# Patient Record
Sex: MALE | Race: WHITE | NOT HISPANIC OR LATINO | Employment: OTHER | URBAN - NONMETROPOLITAN AREA
[De-identification: names, ages, dates, MRNs, and addresses within clinical notes are randomized per-mention and may not be internally consistent; named-entity substitution may affect disease eponyms.]

---

## 2017-04-17 ENCOUNTER — OFFICE VISIT (OUTPATIENT)
Dept: FAMILY MEDICINE CLINIC | Facility: CLINIC | Age: 67
End: 2017-04-17

## 2017-04-17 VITALS
SYSTOLIC BLOOD PRESSURE: 136 MMHG | BODY MASS INDEX: 34.37 KG/M2 | WEIGHT: 219 LBS | HEIGHT: 67 IN | DIASTOLIC BLOOD PRESSURE: 86 MMHG

## 2017-04-17 DIAGNOSIS — F41.8 MIXED ANXIETY AND DEPRESSIVE DISORDER: Chronic | ICD-10-CM

## 2017-04-17 DIAGNOSIS — F41.0 PANIC ATTACKS: Primary | ICD-10-CM

## 2017-04-17 PROCEDURE — 99213 OFFICE O/P EST LOW 20 MIN: CPT | Performed by: INTERNAL MEDICINE

## 2017-04-17 RX ORDER — LORAZEPAM 0.5 MG/1
0.5 TABLET ORAL EVERY 8 HOURS PRN
Qty: 30 TABLET | Refills: 0 | Status: SHIPPED | OUTPATIENT
Start: 2017-04-17 | End: 2019-01-07 | Stop reason: SDUPTHER

## 2017-04-17 RX ORDER — CITALOPRAM 40 MG/1
40 TABLET ORAL DAILY
Qty: 90 TABLET | Refills: 3 | Status: SHIPPED | OUTPATIENT
Start: 2017-04-17 | End: 2017-06-13

## 2017-04-17 NOTE — PROGRESS NOTES
[facility]#  [id number]

## 2017-04-17 NOTE — PATIENT INSTRUCTIONS

## 2017-04-17 NOTE — PROGRESS NOTES
Subjective   Jaden Faith is a 67 y.o. male who presents to the office complaining of panic attacks.  He has a history of anxiety and depression, and currently takes Celexa 20 mg daily.  He has been having panic attacks approximately once per month for the past several months.  He feels like things are closing in on him and his heart starts racing.  He feels as if he is going to pass out and becomes fatigued.  He sits down to rest, and the symptoms eventually resolve.     History of Present Illness     The following portions of the patient's history were reviewed and updated as appropriate: allergies, current medications, past family history, past medical history, past social history, past surgical history and problem list.    Review of Systems   Constitutional: Negative for chills, fatigue and fever.   HENT: Negative for congestion, sneezing, sore throat and trouble swallowing.    Eyes: Negative for visual disturbance.   Respiratory: Negative for cough, chest tightness, shortness of breath and wheezing.    Cardiovascular: Negative for chest pain, palpitations and leg swelling.   Gastrointestinal: Negative for abdominal pain, constipation, diarrhea, nausea and vomiting.   Genitourinary: Negative for dysuria, frequency and urgency.   Musculoskeletal: Negative for neck pain.   Skin: Negative for rash.   Neurological: Negative for dizziness, weakness and headaches.   Psychiatric/Behavioral: The patient is nervous/anxious.         Patient denies any feelings of depression and has not felt down, hopeless or lost interest in any activities.   All other systems reviewed and are negative.      Objective   Physical Exam   Constitutional: He is oriented to person, place, and time. He appears well-developed and well-nourished. No distress.   HENT:   Head: Normocephalic and atraumatic.   Nose: Nose normal.   Mouth/Throat: Oropharynx is clear and moist. No oropharyngeal exudate.   Eyes: Conjunctivae and EOM are normal.  Pupils are equal, round, and reactive to light. No scleral icterus.   Neck: Normal range of motion. Neck supple.   Cardiovascular: Normal rate, regular rhythm and normal heart sounds.  Exam reveals no gallop and no friction rub.    No murmur heard.  Pulmonary/Chest: Effort normal and breath sounds normal. No respiratory distress. He has no wheezes. He has no rales.   Abdominal: Soft. Bowel sounds are normal. He exhibits no distension. There is no tenderness. There is no rebound and no guarding.   Musculoskeletal: Normal range of motion. He exhibits no edema.   Lymphadenopathy:     He has no cervical adenopathy.   Neurological: He is alert and oriented to person, place, and time. No cranial nerve deficit.   Skin: Skin is warm and dry. No rash noted.   Psychiatric: He has a normal mood and affect. His behavior is normal. Judgment and thought content normal.   Nursing note and vitals reviewed.      Assessment/Plan   Jaden was seen today for panic attacks.    Diagnoses and all orders for this visit:    Panic attacks  Comments:  New Problem  Orders:  -     LORazepam (ATIVAN) 0.5 MG tablet; Take 1 tablet by mouth Every 8 (Eight) Hours As Needed for Anxiety or Seizures (panic attacks).    Mixed anxiety and depressive disorder  -     citalopram (CeleXA) 40 MG tablet; Take 1 tablet by mouth Daily.  -     LORazepam (ATIVAN) 0.5 MG tablet; Take 1 tablet by mouth Every 8 (Eight) Hours As Needed for Anxiety or Seizures (panic attacks).     he will continue with Celexa and increase the dose to 40 mg daily.  He is also given lorazepam to use as needed during a panic attack.  He has a history of a seizure disorder, so he may also use the lorazepam in the event of a seizure, although he has been seizure-free for quite some time.    Patient understands the risks associated with this controlled medication, including tolerance and addiction.  Patient also agrees to only obtain this medication from me, and not from a another provider,  unless that provider is covering for me in my absence.  Patient also agrees to be compliant in dosing, and not self adjust the dose of medication.  A signed controlled substance agreement is on file, and the patient has received a controlled substance education sheet at this or a previous visit.  The patient has also signed a consent for treatment with a controlled substance as per [facility] policy. ALONDRA is obtained.    Patients BMI indicates that he is overweight.  I discussed the importance of weight loss, and have recommended a diet and exercise regimen.           PHQ-9 Depression Screening 4/17/2017   Little interest or pleasure in doing things 1   Feeling down, depressed, or hopeless 0   Trouble falling or staying asleep, or sleeping too much 0   Feeling tired or having little energy 0   Poor appetite or overeating 0   Feeling bad about yourself - or that you are a failure or have let yourself or your family down 0   Trouble concentrating on things, such as reading the newspaper or watching television 1   Moving or speaking so slowly that other people could have noticed. Or the opposite - being so fidgety or restless that you have been moving around a lot more than usual 1   Thoughts that you would be better off dead, or of hurting yourself in some way 0   PHQ-9 Total Score 3   If you checked off any problems, how difficult have these problems made it for you to do your work, take care of things at home, or get along with other people? Somewhat difficult

## 2017-05-16 ENCOUNTER — LAB (OUTPATIENT)
Dept: LAB | Facility: OTHER | Age: 67
End: 2017-05-16

## 2017-05-16 DIAGNOSIS — E78.2 MIXED HYPERLIPIDEMIA: Chronic | ICD-10-CM

## 2017-05-16 DIAGNOSIS — I10 ESSENTIAL HYPERTENSION: ICD-10-CM

## 2017-05-16 DIAGNOSIS — R73.02 IMPAIRED GLUCOSE TOLERANCE: ICD-10-CM

## 2017-05-16 LAB
ALBUMIN SERPL-MCNC: 4 G/DL (ref 3.2–5.5)
ALBUMIN/GLOB SERPL: 1.2 G/DL (ref 1–3)
ALP SERPL-CCNC: 65 U/L (ref 15–121)
ALT SERPL W P-5'-P-CCNC: 18 U/L (ref 10–60)
ANION GAP SERPL CALCULATED.3IONS-SCNC: 8 MMOL/L (ref 5–15)
AST SERPL-CCNC: 19 U/L (ref 10–60)
BACTERIA UR QL AUTO: ABNORMAL /HPF
BASOPHILS # BLD AUTO: 0.07 10*3/MM3 (ref 0–0.2)
BASOPHILS NFR BLD AUTO: 0.9 % (ref 0–2)
BILIRUB SERPL-MCNC: 1.2 MG/DL (ref 0.2–1)
BILIRUB UR QL STRIP: NEGATIVE
BUN BLD-MCNC: 23 MG/DL (ref 8–25)
BUN/CREAT SERPL: 25.6 (ref 7–25)
CALCIUM SPEC-SCNC: 9.2 MG/DL (ref 8.4–10.8)
CHLORIDE SERPL-SCNC: 106 MMOL/L (ref 100–112)
CHOLEST SERPL-MCNC: 172 MG/DL (ref 150–200)
CLARITY UR: ABNORMAL
CO2 SERPL-SCNC: 26 MMOL/L (ref 20–32)
COLOR UR: YELLOW
CREAT BLD-MCNC: 0.9 MG/DL (ref 0.4–1.3)
DEPRECATED RDW RBC AUTO: 45.2 FL (ref 35.1–43.9)
EOSINOPHIL # BLD AUTO: 0.19 10*3/MM3 (ref 0–0.7)
EOSINOPHIL NFR BLD AUTO: 2.4 % (ref 0–7)
ERYTHROCYTE [DISTWIDTH] IN BLOOD BY AUTOMATED COUNT: 14.3 % (ref 11.5–14.5)
GFR SERPL CREATININE-BSD FRML MDRD: 84 ML/MIN/1.73 (ref 49–113)
GLOBULIN UR ELPH-MCNC: 3.4 GM/DL (ref 2.5–4.6)
GLUCOSE BLD-MCNC: 120 MG/DL (ref 70–100)
GLUCOSE UR STRIP-MCNC: NEGATIVE MG/DL
HCT VFR BLD AUTO: 50.6 % (ref 39–49)
HDLC SERPL-MCNC: 34 MG/DL (ref 35–100)
HGB BLD-MCNC: 16.5 G/DL (ref 13.7–17.3)
HGB UR QL STRIP.AUTO: ABNORMAL
HYALINE CASTS UR QL AUTO: ABNORMAL /LPF
KETONES UR QL STRIP: NEGATIVE
LDLC SERPL CALC-MCNC: 112 MG/DL
LDLC/HDLC SERPL: 3.3 {RATIO}
LEUKOCYTE ESTERASE UR QL STRIP.AUTO: NEGATIVE
LYMPHOCYTES # BLD AUTO: 2.01 10*3/MM3 (ref 0.6–4.2)
LYMPHOCYTES NFR BLD AUTO: 25 % (ref 10–50)
MCH RBC QN AUTO: 28.9 PG (ref 26.5–34)
MCHC RBC AUTO-ENTMCNC: 32.6 G/DL (ref 31.5–36.3)
MCV RBC AUTO: 88.8 FL (ref 80–98)
MONOCYTES # BLD AUTO: 0.59 10*3/MM3 (ref 0–0.9)
MONOCYTES NFR BLD AUTO: 7.3 % (ref 0–12)
NEUTROPHILS # BLD AUTO: 5.19 10*3/MM3 (ref 2–8.6)
NEUTROPHILS NFR BLD AUTO: 64.4 % (ref 37–80)
NITRITE UR QL STRIP: NEGATIVE
PH UR STRIP.AUTO: 6.5 [PH] (ref 5.5–8)
PLATELET # BLD AUTO: 198 10*3/MM3 (ref 150–450)
PMV BLD AUTO: 11.9 FL (ref 8–12)
POTASSIUM BLD-SCNC: 4.3 MMOL/L (ref 3.4–5.4)
PROT SERPL-MCNC: 7.4 G/DL (ref 6.7–8.2)
PROT UR QL STRIP: ABNORMAL
RBC # BLD AUTO: 5.7 10*6/MM3 (ref 4.37–5.74)
RBC # UR: ABNORMAL /HPF
REF LAB TEST METHOD: ABNORMAL
SODIUM BLD-SCNC: 140 MMOL/L (ref 134–146)
SP GR UR STRIP: 1.02 (ref 1–1.03)
SQUAMOUS #/AREA URNS HPF: ABNORMAL /HPF
TRIGL SERPL-MCNC: 129 MG/DL (ref 35–160)
UROBILINOGEN UR QL STRIP: ABNORMAL
VLDLC SERPL-MCNC: 25.8 MG/DL
WBC NRBC COR # BLD: 8.05 10*3/MM3 (ref 3.2–9.8)
WBC UR QL AUTO: ABNORMAL /HPF

## 2017-05-16 PROCEDURE — 84439 ASSAY OF FREE THYROXINE: CPT | Performed by: INTERNAL MEDICINE

## 2017-05-16 PROCEDURE — 81001 URINALYSIS AUTO W/SCOPE: CPT | Performed by: INTERNAL MEDICINE

## 2017-05-16 PROCEDURE — 80053 COMPREHEN METABOLIC PANEL: CPT | Performed by: INTERNAL MEDICINE

## 2017-05-16 PROCEDURE — 36415 COLL VENOUS BLD VENIPUNCTURE: CPT | Performed by: INTERNAL MEDICINE

## 2017-05-16 PROCEDURE — 83036 HEMOGLOBIN GLYCOSYLATED A1C: CPT | Performed by: INTERNAL MEDICINE

## 2017-05-16 PROCEDURE — 80061 LIPID PANEL: CPT | Performed by: INTERNAL MEDICINE

## 2017-05-16 PROCEDURE — 84443 ASSAY THYROID STIM HORMONE: CPT | Performed by: INTERNAL MEDICINE

## 2017-05-16 PROCEDURE — 85025 COMPLETE CBC W/AUTO DIFF WBC: CPT | Performed by: INTERNAL MEDICINE

## 2017-05-17 LAB
HBA1C MFR BLD: 6.34 % (ref 4–5.6)
T4 FREE SERPL-MCNC: 1.13 NG/DL (ref 0.78–2.19)
TSH SERPL DL<=0.05 MIU/L-ACNC: 1.46 MIU/ML (ref 0.46–4.68)

## 2017-05-18 ENCOUNTER — OFFICE VISIT (OUTPATIENT)
Dept: FAMILY MEDICINE CLINIC | Facility: CLINIC | Age: 67
End: 2017-05-18

## 2017-05-18 VITALS
HEIGHT: 67 IN | SYSTOLIC BLOOD PRESSURE: 126 MMHG | BODY MASS INDEX: 34.53 KG/M2 | HEART RATE: 68 BPM | DIASTOLIC BLOOD PRESSURE: 80 MMHG | WEIGHT: 220 LBS

## 2017-05-18 DIAGNOSIS — R73.02 IMPAIRED GLUCOSE TOLERANCE: Primary | Chronic | ICD-10-CM

## 2017-05-18 DIAGNOSIS — I10 ESSENTIAL HYPERTENSION: Chronic | ICD-10-CM

## 2017-05-18 DIAGNOSIS — F41.8 MIXED ANXIETY AND DEPRESSIVE DISORDER: Chronic | ICD-10-CM

## 2017-05-18 DIAGNOSIS — Z11.59 NEED FOR HEPATITIS C SCREENING TEST: ICD-10-CM

## 2017-05-18 DIAGNOSIS — E78.2 MIXED HYPERLIPIDEMIA: Chronic | ICD-10-CM

## 2017-05-18 PROBLEM — C61 PROSTATE CANCER: Chronic | Status: ACTIVE | Noted: 2017-05-18

## 2017-05-18 PROCEDURE — 99214 OFFICE O/P EST MOD 30 MIN: CPT | Performed by: INTERNAL MEDICINE

## 2017-05-18 RX ORDER — METOPROLOL SUCCINATE 50 MG/1
50 TABLET, EXTENDED RELEASE ORAL DAILY
Qty: 90 TABLET | Refills: 3 | Status: SHIPPED | OUTPATIENT
Start: 2017-05-18 | End: 2018-04-16 | Stop reason: SDUPTHER

## 2017-06-13 ENCOUNTER — OFFICE VISIT (OUTPATIENT)
Dept: FAMILY MEDICINE CLINIC | Facility: CLINIC | Age: 67
End: 2017-06-13

## 2017-06-13 VITALS
HEART RATE: 70 BPM | BODY MASS INDEX: 34.53 KG/M2 | TEMPERATURE: 98 F | SYSTOLIC BLOOD PRESSURE: 130 MMHG | DIASTOLIC BLOOD PRESSURE: 70 MMHG | WEIGHT: 220 LBS | HEIGHT: 67 IN
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
## 2017-06-13 DIAGNOSIS — F41.8 MIXED ANXIETY AND DEPRESSIVE DISORDER: Chronic | ICD-10-CM

## 2017-06-13 DIAGNOSIS — R56.9 SEIZURES (HCC): Chronic | ICD-10-CM

## 2017-06-13 PROCEDURE — 99214 OFFICE O/P EST MOD 30 MIN: CPT | Performed by: INTERNAL MEDICINE

## 2017-06-13 RX ORDER — FLUOXETINE HYDROCHLORIDE 40 MG/1
40 CAPSULE ORAL DAILY
Qty: 90 CAPSULE | Refills: 1 | Status: SHIPPED | OUTPATIENT
Start: 2017-06-13 | End: 2017-11-09 | Stop reason: SDUPTHER
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
## 2017-08-14 DIAGNOSIS — R56.9 SEIZURES (HCC): Chronic | ICD-10-CM

## 2017-08-14 RX ORDER — LEVETIRACETAM 500 MG/1
500 TABLET ORAL 2 TIMES DAILY
Qty: 180 TABLET | Refills: 3 | Status: SHIPPED | OUTPATIENT
Start: 2017-08-14 | End: 2018-07-06 | Stop reason: SDUPTHER

## 2017-09-05 DIAGNOSIS — K21.9 GASTROESOPHAGEAL REFLUX DISEASE WITHOUT ESOPHAGITIS: Chronic | ICD-10-CM

## 2017-09-11 RX ORDER — OMEPRAZOLE 20 MG/1
CAPSULE, DELAYED RELEASE ORAL
Qty: 90 CAPSULE | Refills: 3 | Status: SHIPPED | OUTPATIENT
Start: 2017-09-11 | End: 2018-05-15 | Stop reason: SDUPTHER

## 2017-11-09 DIAGNOSIS — F41.8 MIXED ANXIETY AND DEPRESSIVE DISORDER: Chronic | ICD-10-CM

## 2017-11-09 RX ORDER — FLUOXETINE HYDROCHLORIDE 40 MG/1
CAPSULE ORAL
Qty: 90 CAPSULE | Refills: 1 | Status: SHIPPED | OUTPATIENT
Start: 2017-11-09 | End: 2018-06-12 | Stop reason: SDUPTHER

## 2017-12-15 DIAGNOSIS — Z12.5 ENCOUNTER FOR SCREENING FOR MALIGNANT NEOPLASM OF PROSTATE: ICD-10-CM

## 2017-12-15 DIAGNOSIS — I10 ESSENTIAL HYPERTENSION: Primary | Chronic | ICD-10-CM

## 2017-12-15 DIAGNOSIS — E78.2 MIXED HYPERLIPIDEMIA: Chronic | ICD-10-CM

## 2017-12-15 DIAGNOSIS — R73.02 IMPAIRED GLUCOSE TOLERANCE: Chronic | ICD-10-CM

## 2017-12-28 ENCOUNTER — LAB (OUTPATIENT)
Dept: LAB | Facility: OTHER | Age: 67
End: 2017-12-28

## 2017-12-28 DIAGNOSIS — E78.2 MIXED HYPERLIPIDEMIA: Chronic | ICD-10-CM

## 2017-12-28 DIAGNOSIS — I10 ESSENTIAL HYPERTENSION: ICD-10-CM

## 2017-12-28 DIAGNOSIS — R73.02 IMPAIRED GLUCOSE TOLERANCE: Chronic | ICD-10-CM

## 2017-12-28 LAB
ALBUMIN SERPL-MCNC: 3.9 G/DL (ref 3.2–5.5)
ALBUMIN/GLOB SERPL: 1.1 G/DL (ref 1–3)
ALP SERPL-CCNC: 69 U/L (ref 15–121)
ALT SERPL W P-5'-P-CCNC: 21 U/L (ref 10–60)
ANION GAP SERPL CALCULATED.3IONS-SCNC: 9 MMOL/L (ref 5–15)
ARTICHOKE IGE QN: 139 MG/DL (ref 0–129)
AST SERPL-CCNC: 22 U/L (ref 10–60)
BACTERIA UR QL AUTO: ABNORMAL /HPF
BASOPHILS # BLD AUTO: 0.07 10*3/MM3 (ref 0–0.2)
BASOPHILS NFR BLD AUTO: 0.9 % (ref 0–2)
BILIRUB SERPL-MCNC: 1.2 MG/DL (ref 0.2–1)
BILIRUB UR QL STRIP: NEGATIVE
BUN BLD-MCNC: 26 MG/DL (ref 8–25)
BUN/CREAT SERPL: 28.9 (ref 7–25)
CALCIUM SPEC-SCNC: 9.4 MG/DL (ref 8.4–10.8)
CHLORIDE SERPL-SCNC: 106 MMOL/L (ref 100–112)
CLARITY UR: CLEAR
CO2 SERPL-SCNC: 27 MMOL/L (ref 20–32)
COLOR UR: YELLOW
CREAT BLD-MCNC: 0.9 MG/DL (ref 0.4–1.3)
DEPRECATED RDW RBC AUTO: 44.6 FL (ref 35.1–43.9)
EOSINOPHIL # BLD AUTO: 0.21 10*3/MM3 (ref 0–0.7)
EOSINOPHIL NFR BLD AUTO: 2.6 % (ref 0–7)
ERYTHROCYTE [DISTWIDTH] IN BLOOD BY AUTOMATED COUNT: 14.6 % (ref 11.5–14.5)
GFR SERPL CREATININE-BSD FRML MDRD: 84 ML/MIN/1.73 (ref 49–113)
GLOBULIN UR ELPH-MCNC: 3.4 GM/DL (ref 2.5–4.6)
GLUCOSE BLD-MCNC: 131 MG/DL (ref 70–100)
GLUCOSE UR STRIP-MCNC: NEGATIVE MG/DL
HBA1C MFR BLD: 6.3 % (ref 4–5.6)
HCT VFR BLD AUTO: 54.8 % (ref 39–49)
HGB BLD-MCNC: 17.8 G/DL (ref 13.7–17.3)
HGB UR QL STRIP.AUTO: ABNORMAL
HYALINE CASTS UR QL AUTO: ABNORMAL /LPF
KETONES UR QL STRIP: NEGATIVE
LEUKOCYTE ESTERASE UR QL STRIP.AUTO: NEGATIVE
LYMPHOCYTES # BLD AUTO: 1.58 10*3/MM3 (ref 0.6–4.2)
LYMPHOCYTES NFR BLD AUTO: 19.3 % (ref 10–50)
MCH RBC QN AUTO: 28 PG (ref 26.5–34)
MCHC RBC AUTO-ENTMCNC: 32.5 G/DL (ref 31.5–36.3)
MCV RBC AUTO: 86.3 FL (ref 80–98)
MONOCYTES # BLD AUTO: 0.55 10*3/MM3 (ref 0–0.9)
MONOCYTES NFR BLD AUTO: 6.7 % (ref 0–12)
MUCOUS THREADS URNS QL MICRO: ABNORMAL /HPF
NEUTROPHILS # BLD AUTO: 5.79 10*3/MM3 (ref 2–8.6)
NEUTROPHILS NFR BLD AUTO: 70.5 % (ref 37–80)
NITRITE UR QL STRIP: NEGATIVE
PH UR STRIP.AUTO: 7 [PH] (ref 5.5–8)
PLATELET # BLD AUTO: 224 10*3/MM3 (ref 150–450)
PMV BLD AUTO: 11.2 FL (ref 8–12)
POTASSIUM BLD-SCNC: 4.2 MMOL/L (ref 3.4–5.4)
PROT SERPL-MCNC: 7.3 G/DL (ref 6.7–8.2)
PROT UR QL STRIP: ABNORMAL
RBC # BLD AUTO: 6.35 10*6/MM3 (ref 4.37–5.74)
RBC # UR: ABNORMAL /HPF
REF LAB TEST METHOD: ABNORMAL
SODIUM BLD-SCNC: 142 MMOL/L (ref 134–146)
SP GR UR STRIP: 1.02 (ref 1–1.03)
SQUAMOUS #/AREA URNS HPF: ABNORMAL /HPF
TSH SERPL DL<=0.05 MIU/L-ACNC: 1.73 MIU/ML (ref 0.46–4.68)
UROBILINOGEN UR QL STRIP: ABNORMAL
WBC NRBC COR # BLD: 8.2 10*3/MM3 (ref 3.2–9.8)
WBC UR QL AUTO: ABNORMAL /HPF

## 2017-12-28 PROCEDURE — 84443 ASSAY THYROID STIM HORMONE: CPT | Performed by: INTERNAL MEDICINE

## 2017-12-28 PROCEDURE — 83721 ASSAY OF BLOOD LIPOPROTEIN: CPT | Performed by: INTERNAL MEDICINE

## 2017-12-28 PROCEDURE — 36415 COLL VENOUS BLD VENIPUNCTURE: CPT | Performed by: INTERNAL MEDICINE

## 2017-12-28 PROCEDURE — 85025 COMPLETE CBC W/AUTO DIFF WBC: CPT | Performed by: INTERNAL MEDICINE

## 2017-12-28 PROCEDURE — 83036 HEMOGLOBIN GLYCOSYLATED A1C: CPT | Performed by: INTERNAL MEDICINE

## 2017-12-28 PROCEDURE — 80053 COMPREHEN METABOLIC PANEL: CPT | Performed by: INTERNAL MEDICINE

## 2017-12-28 PROCEDURE — 81001 URINALYSIS AUTO W/SCOPE: CPT | Performed by: INTERNAL MEDICINE

## 2018-01-05 ENCOUNTER — OFFICE VISIT (OUTPATIENT)
Dept: FAMILY MEDICINE CLINIC | Facility: CLINIC | Age: 68
End: 2018-01-05

## 2018-01-05 VITALS
WEIGHT: 222 LBS | DIASTOLIC BLOOD PRESSURE: 80 MMHG | BODY MASS INDEX: 34.84 KG/M2 | SYSTOLIC BLOOD PRESSURE: 124 MMHG | HEIGHT: 67 IN | HEART RATE: 66 BPM

## 2018-01-05 DIAGNOSIS — Z23 PNEUMOCOCCAL VACCINATION GIVEN: ICD-10-CM

## 2018-01-05 DIAGNOSIS — R73.02 IMPAIRED GLUCOSE TOLERANCE: Chronic | ICD-10-CM

## 2018-01-05 DIAGNOSIS — F41.8 MIXED ANXIETY AND DEPRESSIVE DISORDER: Chronic | ICD-10-CM

## 2018-01-05 DIAGNOSIS — E78.2 MIXED HYPERLIPIDEMIA: Chronic | ICD-10-CM

## 2018-01-05 DIAGNOSIS — Z11.59 NEED FOR HEPATITIS C SCREENING TEST: ICD-10-CM

## 2018-01-05 DIAGNOSIS — Z00.00 INITIAL MEDICARE ANNUAL WELLNESS VISIT: Primary | ICD-10-CM

## 2018-01-05 DIAGNOSIS — I10 ESSENTIAL HYPERTENSION: Chronic | ICD-10-CM

## 2018-01-05 PROCEDURE — 99214 OFFICE O/P EST MOD 30 MIN: CPT | Performed by: INTERNAL MEDICINE

## 2018-01-05 PROCEDURE — 90732 PPSV23 VACC 2 YRS+ SUBQ/IM: CPT | Performed by: INTERNAL MEDICINE

## 2018-01-05 PROCEDURE — G0009 ADMIN PNEUMOCOCCAL VACCINE: HCPCS | Performed by: INTERNAL MEDICINE

## 2018-01-05 PROCEDURE — G0438 PPPS, INITIAL VISIT: HCPCS | Performed by: INTERNAL MEDICINE

## 2018-01-05 NOTE — PROGRESS NOTES
QUICK REFERENCE INFORMATION:  The ABCs of the Annual Wellness Visit    Initial Medicare Wellness Visit    HEALTH RISK ASSESSMENT    1950    Recent Hospitalizations:  Recently treated at the following:  Other: [facility], 02/2017 (Prostatectomy).        Current Medical Providers:  Patient Care Team:  Ruiz Li MD as PCP - General  Ruiz Li MD as PCP - Claims Attributed  Garth Hawley MD as Consulting Physician (Neurology)  Gordon Lorenzo MD as Consulting Physician (Urology)  Ed Colunga MD as Surgeon (Otolaryngology)        Smoking Status:  History   Smoking Status   • Never Smoker   Smokeless Tobacco   • Never Used       Alcohol Consumption:  History   Alcohol Use No       Depression Screen:   PHQ-2/PHQ-9 Depression Screening 1/5/2018   Little interest or pleasure in doing things 0   Feeling down, depressed, or hopeless 0   Trouble falling or staying asleep, or sleeping too much 1   Feeling tired or having little energy 0   Poor appetite or overeating 0   Feeling bad about yourself - or that you are a failure or have let yourself or your family down 0   Trouble concentrating on things, such as reading the newspaper or watching television 0   Moving or speaking so slowly that other people could have noticed. Or the opposite - being so fidgety or restless that you have been moving around a lot more than usual 0   Thoughts that you would be better off dead, or of hurting yourself in some way 0   Total Score 1   If you checked off any problems, how difficult have these problems made it for you to do your work, take care of things at home, or get along with other people? Not difficult at all       Health Habits and Functional and Cognitive Screening:  Functional & Cognitive Status 1/5/2018   Do you have difficulty preparing food and eating? No   Do you have difficulty bathing yourself, getting dressed or grooming yourself? No   Do you have difficulty using the toilet? No   Do you have  difficulty moving around from place to place? No   Do you have trouble with steps or getting out of a bed or a chair? No   In the past year have you fallen or experienced a near fall? No   Current Diet Well Balanced Diet   Dental Exam Not up to date   Eye Exam Not up to date   Exercise (times per week) 4 times per week   Current Exercise Activities Include Walking   Do you need help using the phone?  No   Are you deaf or do you have serious difficulty hearing?  No   Do you need help with transportation? No   Do you need help shopping? No   Do you need help preparing meals?  No   Do you need help with housework?  No   Do you need help with laundry? No   Do you need help taking your medications? No   Do you need help managing money? No   Have you felt unusual stress, anger or loneliness in the last month? No   Who do you live with? Spouse   If you need help, do you have trouble finding someone available to you? No   Have you been bothered in the last four weeks by sexual problems? No   Do you have difficulty concentrating, remembering or making decisions? No           Does the patient have evidence of cognitive impairment? No    Asiprin use counseling: not taking ASA      Recent Lab Results:    Visual Acuity:  No exam data present    Age-appropriate Screening Schedule:  Refer to the list below for future screening recommendations based on patient's age, sex and/or medical conditions. Orders for these recommended tests are listed in the plan section. The patient has been provided with a written plan.    Health Maintenance   Topic Date Due   • ZOSTER VACCINE  09/30/2016   • PROSTATE CANCER SCREENING  11/14/2017   • PNEUMOCOCCAL VACCINES (65+ LOW/MEDIUM RISK) (2 of 2 - PPSV23) 11/17/2017   • LIPID PANEL  12/28/2018   • COLONOSCOPY  06/06/2026   • TDAP/TD VACCINES (2 - Td) 11/17/2026   • INFLUENZA VACCINE  Completed        Subjective   History of Present Illness    Jaden Faith is a 67 y.o. male who presents for  "an Annual Wellness Visit.    The following portions of the patient's history were reviewed and updated as appropriate: allergies, current medications, past family history, past medical history, past social history, past surgical history and problem list.    Outpatient Medications Prior to Visit   Medication Sig Dispense Refill   • FLUoxetine (PROzac) 40 MG capsule TAKE 1 CAPSULE EVERY DAY (REPLACES CITALOPRAM). 90 capsule 1   • levETIRAcetam (KEPPRA) 500 MG tablet Take 1 tablet by mouth 2 (Two) Times a Day. 180 tablet 3   • metoprolol succinate XL (TOPROL-XL) 50 MG 24 hr tablet Take 1 tablet by mouth Daily. 90 tablet 3   • Multiple Vitamins-Minerals (MULTIVITAMIN ADULT PO) Take 1 tablet by mouth Daily.     • omeprazole (priLOSEC) 20 MG capsule TAKE 1 CAPSULE BY MOUTH DAILY. 90 capsule 3   • LORazepam (ATIVAN) 0.5 MG tablet Take 1 tablet by mouth Every 8 (Eight) Hours As Needed for Anxiety or Seizures (panic attacks). 30 tablet 0     No facility-administered medications prior to visit.        Patient Active Problem List   Diagnosis   • Mixed hyperlipidemia   • Gastroesophageal reflux disease without esophagitis   • Non-seasonal allergic rhinitis due to pollen   • Essential hypertension   • Peripheral vascular disease   • Impaired glucose tolerance   • Mixed anxiety and depressive disorder   • Seizures   • Prostate cancer       Advance Care Planning:  has NO advance directive - not interested in additional information    Identification of Risk Factors:  Risk factors include: weight .    Review of Systems    Compared to one year ago, the patient feels his physical health is the same.  Compared to one year ago, the patient feels his mental health is the same.    Objective     Physical Exam    Vitals:    01/05/18 0859   BP: 124/80   BP Location: Left arm   Patient Position: Sitting   Cuff Size: Large Adult   Pulse: 66   Weight: 101 kg (222 lb)   Height: 170.2 cm (67\")   PainSc: 0-No pain       Body mass index is 34.77 " kg/(m^2).  Discussed the patient's BMI with him. BMI is above normal parameters. Follow-up plan includes:  educational material, exercise counseling and nutrition counseling.    Assessment/Plan   Patient Self-Management and Personalized Health Advice  The patient has been provided with information about: diet, exercise and weight management and preventive services including:   · Diabetes screening, see lab orders, Exercise counseling provided, Fall Risk assessment done, Nutrition counseling provided, Pneumococcal vaccine , Prostate cancer screening discussed.    Visit Diagnoses:    ICD-10-CM ICD-9-CM   1. Initial Medicare annual wellness visit Z00.00 V70.0   2. Essential hypertension I10 401.9   3. Mixed hyperlipidemia E78.2 272.2   4. Impaired glucose tolerance R73.02 790.22   5. Mixed anxiety and depressive disorder F41.8 300.4   6. Need for hepatitis C screening test Z11.59 V73.89   7. Pneumococcal vaccination given Z23 V06.6       Orders Placed This Encounter   Procedures   • Pneumococcal Polysaccharide Vaccine 23-Valent Greater Than or Equal To 1yo Subcutaneous / IM   • Comprehensive Metabolic Panel     Standing Status:   Future     Standing Expiration Date:   7/24/2018   • Hemoglobin A1c     Standing Status:   Future     Standing Expiration Date:   7/24/2018   • Lipid Panel     Standing Status:   Future     Standing Expiration Date:   7/24/2018   • T4, Free     Standing Status:   Future     Standing Expiration Date:   7/24/2018   • TSH     Standing Status:   Future     Standing Expiration Date:   7/24/2018   • Urinalysis With / Culture If Indicated - Urine, Clean Catch     Standing Status:   Future     Standing Expiration Date:   7/24/2018   • Hepatitis C Antibody     Standing Status:   Future     Standing Expiration Date:   7/24/2018   • CBC & Differential     Standing Status:   Future     Standing Expiration Date:   7/24/2018     Order Specific Question:   Manual Differential     Answer:   No        Outpatient Encounter Prescriptions as of 1/5/2018   Medication Sig Dispense Refill   • FLUoxetine (PROzac) 40 MG capsule TAKE 1 CAPSULE EVERY DAY (REPLACES CITALOPRAM). 90 capsule 1   • levETIRAcetam (KEPPRA) 500 MG tablet Take 1 tablet by mouth 2 (Two) Times a Day. 180 tablet 3   • metoprolol succinate XL (TOPROL-XL) 50 MG 24 hr tablet Take 1 tablet by mouth Daily. 90 tablet 3   • Multiple Vitamins-Minerals (MULTIVITAMIN ADULT PO) Take 1 tablet by mouth Daily.     • omeprazole (priLOSEC) 20 MG capsule TAKE 1 CAPSULE BY MOUTH DAILY. 90 capsule 3   • LORazepam (ATIVAN) 0.5 MG tablet Take 1 tablet by mouth Every 8 (Eight) Hours As Needed for Anxiety or Seizures (panic attacks). 30 tablet 0     No facility-administered encounter medications on file as of 1/5/2018.        Reviewed use of high risk medication in the elderly: no  Reviewed for potential of harmful drug interactions in the elderly: no    Follow Up:  Return in about 6 months (around 7/5/2018) for Next scheduled follow up, Or sooner as needed With Labs prior to appointment.     An After Visit Summary and PPPS with all of these plans were given to the patient.

## 2018-01-05 NOTE — PROGRESS NOTES
Chief Complaint   Patient presents with   • Annual Exam     medicare wellness   • Hypertension     Subjective   Jaden Faith is a 67 y.o. male who presents to the office for follow-up and review of labs. He has impaired glucose tolerance and has been monitoring his dietary intake of sugars and carbohydrates.  He has hypertension and his blood pressure has been well controlled.  He has hyperlipidemia and controls this with diet.  He has GERD which is well controlled with omeprazole.  He takes Celexa for treatment of anxiety and depression.  He has a history of seizures and takes Keppra.  He has been seizure-free.      He had a prostatectomy in February 2017. He continues to follow with urology, who manages his PSA levels.     The following portions of the patient's history were reviewed and updated as appropriate: allergies, current medications, past family history, past medical history, past social history, past surgical history and problem list.    Review of Systems   Constitutional: Negative for chills, fatigue and fever.   HENT: Negative for congestion, sneezing, sore throat and trouble swallowing.    Eyes: Negative for visual disturbance.   Respiratory: Negative for cough, chest tightness, shortness of breath and wheezing.    Cardiovascular: Negative for chest pain, palpitations and leg swelling.   Gastrointestinal: Negative for abdominal pain, constipation, diarrhea, nausea and vomiting.   Genitourinary: Negative for dysuria, frequency and urgency.   Musculoskeletal: Negative for neck pain.   Skin: Negative for rash.   Neurological: Negative for dizziness, weakness and headaches.   Psychiatric/Behavioral:        Patient denies any feelings of depression and has not felt down, hopeless or lost interest in any activities.   All other systems reviewed and are negative.      Objective   Vitals:    01/05/18 0859   BP: 124/80   BP Location: Left arm   Patient Position: Sitting   Cuff Size: Large Adult  "  Pulse: 66   Weight: 101 kg (222 lb)   Height: 170.2 cm (67\")   PainSc: 0-No pain     Physical Exam   Constitutional: He is oriented to person, place, and time. He appears well-developed and well-nourished. No distress.   HENT:   Head: Normocephalic and atraumatic.   Nose: Nose normal.   Mouth/Throat: Oropharynx is clear and moist. No oropharyngeal exudate.   Eyes: Conjunctivae and EOM are normal. Pupils are equal, round, and reactive to light. No scleral icterus.   Neck: Normal range of motion. Neck supple.   Cardiovascular: Normal rate, regular rhythm and normal heart sounds.  Exam reveals no gallop and no friction rub.    No murmur heard.  Pulmonary/Chest: Effort normal and breath sounds normal. No respiratory distress. He has no wheezes. He has no rales.   Abdominal: Soft. Bowel sounds are normal. He exhibits no distension. There is no tenderness. There is no rebound and no guarding.   Musculoskeletal: Normal range of motion. He exhibits no edema.   Lymphadenopathy:     He has no cervical adenopathy.   Neurological: He is alert and oriented to person, place, and time. No cranial nerve deficit.   Skin: Skin is warm and dry. No rash noted.   Psychiatric: He has a normal mood and affect. His behavior is normal. Judgment and thought content normal.   Nursing note and vitals reviewed.      Assessment/Plan   Jaden was seen today for annual exam and hypertension.    Diagnoses and all orders for this visit:    Initial Medicare annual wellness visit    Essential hypertension  -     CBC & Differential; Future  -     Comprehensive Metabolic Panel; Future  -     T4, Free; Future  -     TSH; Future  -     Urinalysis With / Culture If Indicated - Urine, Clean Catch; Future    Mixed hyperlipidemia  -     Lipid Panel; Future    Impaired glucose tolerance  -     Hemoglobin A1c; Future    Mixed anxiety and depressive disorder    Need for hepatitis C screening test  -     Hepatitis C Antibody; Future    Pneumococcal vaccination " given  -     Pneumococcal Polysaccharide Vaccine 23-Valent Greater Than or Equal To 1yo Subcutaneous / IM         Labs are reviewed with patient. Patient's glucose is slightly elevated.  Patient will continue to watch diet to help maintain control of the glucose.  Patient understands that there is an increased risk of developing diabetes in the future.  His lipids are controlled and he will continue with dietary management of the hyperlipidemia.  His blood pressure is controlled, so he will continue with his current blood pressure medication.  He will continue with citalopram for treatment of the anxiety and depression.  He will also continue with lorazepam as needed, although he does not need a prescription for this today.  He will continue to follow with urology secondary to the history of prostate cancer.    Patient understands the risks associated with this controlled medication, including tolerance and addiction.  Patient also agrees to only obtain this medication from me, and not from a another provider, unless that provider is covering for me in my absence.  Patient also agrees to be compliant in dosing, and not self adjust the dose of medication.  A signed controlled substance agreement is on file, and the patient has received a controlled substance education sheet at this a previous visit.  The patient has also signed a consent for treatment with a controlled substance as per [facility] policy. ALONDRA was obtained.    PHQ-2/PHQ-9 Depression Screening 1/5/2018   Little interest or pleasure in doing things 0   Feeling down, depressed, or hopeless 0   Trouble falling or staying asleep, or sleeping too much 1   Feeling tired or having little energy 0   Poor appetite or overeating 0   Feeling bad about yourself - or that you are a failure or have let yourself or your family down 0   Trouble concentrating on things, such as reading the newspaper or watching television 0   Moving or speaking so slowly that  other people could have noticed. Or the opposite - being so fidgety or restless that you have been moving around a lot more than usual 0   Thoughts that you would be better off dead, or of hurting yourself in some way 0   Total Score 1   If you checked off any problems, how difficult have these problems made it for you to do your work, take care of things at home, or get along with other people? Not difficult at all         Lab on 12/28/2017   Component Date Value Ref Range Status   • Color, UA 12/28/2017 Yellow  Yellow, Straw Final   • Appearance, UA 12/28/2017 Clear  Clear Final   • pH, UA 12/28/2017 7.0  5.5 - 8.0 Final   • Specific Gravity, UA 12/28/2017 1.020  1.005 - 1.030 Final   • Glucose, UA 12/28/2017 Negative  Negative Final   • Ketones, UA 12/28/2017 Negative  Negative Final   • Bilirubin, UA 12/28/2017 Negative  Negative Final   • Blood, UA 12/28/2017 Trace* Negative Final   • Protein, UA 12/28/2017 30 mg/dL (1+)* Negative Final   • Leuk Esterase, UA 12/28/2017 Negative  Negative Final   • Nitrite, UA 12/28/2017 Negative  Negative Final   • Urobilinogen, UA 12/28/2017 0.2 E.U./dL  0.2 - 1.0 E.U./dL Final   • Glucose 12/28/2017 131* 70 - 100 mg/dL Final   • BUN 12/28/2017 26* 8 - 25 mg/dL Final   • Creatinine 12/28/2017 0.90  0.40 - 1.30 mg/dL Final   • Sodium 12/28/2017 142  134 - 146 mmol/L Final   • Potassium 12/28/2017 4.2  3.4 - 5.4 mmol/L Final   • Chloride 12/28/2017 106  100 - 112 mmol/L Final   • CO2 12/28/2017 27.0  20.0 - 32.0 mmol/L Final   • Calcium 12/28/2017 9.4  8.4 - 10.8 mg/dL Final   • Total Protein 12/28/2017 7.3  6.7 - 8.2 g/dL Final   • Albumin 12/28/2017 3.90  3.20 - 5.50 g/dL Final   • ALT (SGPT) 12/28/2017 21  10 - 60 U/L Final   • AST (SGOT) 12/28/2017 22  10 - 60 U/L Final   • Alkaline Phosphatase 12/28/2017 69  15 - 121 U/L Final   • Total Bilirubin 12/28/2017 1.2* 0.2 - 1.0 mg/dL Final   • eGFR Non  Amer 12/28/2017 84  49 - 113 mL/min/1.73 Final   • Globulin 12/28/2017  3.4  2.5 - 4.6 gm/dL Final   • A/G Ratio 12/28/2017 1.1  1.0 - 3.0 g/dL Final   • BUN/Creatinine Ratio 12/28/2017 28.9* 7.0 - 25.0 Final   • Anion Gap 12/28/2017 9.0  5.0 - 15.0 mmol/L Final   • LDL Cholesterol  12/28/2017 139* 0 - 129 mg/dL Final   • TSH 12/28/2017 1.730  0.460 - 4.680 mIU/mL Final   • Hemoglobin A1C 12/28/2017 6.3* 4 - 5.6 % Final   • WBC 12/28/2017 8.20  3.20 - 9.80 10*3/mm3 Final   • RBC 12/28/2017 6.35* 4.37 - 5.74 10*6/mm3 Final   • Hemoglobin 12/28/2017 17.8* 13.7 - 17.3 g/dL Final   • Hematocrit 12/28/2017 54.8* 39.0 - 49.0 % Final   • MCV 12/28/2017 86.3  80.0 - 98.0 fL Final   • MCH 12/28/2017 28.0  26.5 - 34.0 pg Final   • MCHC 12/28/2017 32.5  31.5 - 36.3 g/dL Final   • RDW 12/28/2017 14.6* 11.5 - 14.5 % Final   • RDW-SD 12/28/2017 44.6* 35.1 - 43.9 fl Final   • MPV 12/28/2017 11.2  8.0 - 12.0 fL Final   • Platelets 12/28/2017 224  150 - 450 10*3/mm3 Final   • Neutrophil % 12/28/2017 70.5  37.0 - 80.0 % Final   • Lymphocyte % 12/28/2017 19.3  10.0 - 50.0 % Final   • Monocyte % 12/28/2017 6.7  0.0 - 12.0 % Final   • Eosinophil % 12/28/2017 2.6  0.0 - 7.0 % Final   • Basophil % 12/28/2017 0.9  0.0 - 2.0 % Final   • Neutrophils, Absolute 12/28/2017 5.79  2.00 - 8.60 10*3/mm3 Final   • Lymphocytes, Absolute 12/28/2017 1.58  0.60 - 4.20 10*3/mm3 Final   • Monocytes, Absolute 12/28/2017 0.55  0.00 - 0.90 10*3/mm3 Final   • Eosinophils, Absolute 12/28/2017 0.21  0.00 - 0.70 10*3/mm3 Final   • Basophils, Absolute 12/28/2017 0.07  0.00 - 0.20 10*3/mm3 Final   • RBC, UA 12/28/2017 3-5* None Seen /HPF Final   • WBC, UA 12/28/2017 0-2* None Seen /HPF Final   • Bacteria, UA 12/28/2017 Trace* None Seen /HPF Final   • Squamous Epithelial Cells, UA 12/28/2017 None Seen  None Seen, 0-2 /HPF Final   • Hyaline Casts, UA 12/28/2017 None Seen  None Seen /LPF Final   • Mucus, UA 12/28/2017 Small/1+* None Seen, Trace /HPF Final   • Methodology 12/28/2017 Manual Light Microscopy   Final   ]

## 2018-01-05 NOTE — PATIENT INSTRUCTIONS

## 2018-04-16 DIAGNOSIS — I10 ESSENTIAL HYPERTENSION: Chronic | ICD-10-CM

## 2018-04-16 RX ORDER — METOPROLOL SUCCINATE 50 MG/1
50 TABLET, EXTENDED RELEASE ORAL DAILY
Qty: 30 TABLET | Refills: 0 | Status: SHIPPED | OUTPATIENT
Start: 2018-04-16 | End: 2018-05-15 | Stop reason: SDUPTHER

## 2018-04-16 NOTE — TELEPHONE ENCOUNTER
Rivka Parham LPN  Phone Number: [phone]     Needs refill on  metoprolol succinate XL (TOPROL-XL) 50 MG 24 hr tablet, to [facility] in [place], wants a 30 day supply.

## 2018-05-15 ENCOUNTER — TELEPHONE (OUTPATIENT)
Dept: FAMILY MEDICINE CLINIC | Facility: CLINIC | Age: 68
End: 2018-05-15

## 2018-05-15 DIAGNOSIS — I10 ESSENTIAL HYPERTENSION: Chronic | ICD-10-CM

## 2018-05-15 DIAGNOSIS — K21.9 GASTROESOPHAGEAL REFLUX DISEASE WITHOUT ESOPHAGITIS: Chronic | ICD-10-CM

## 2018-05-15 RX ORDER — OMEPRAZOLE 20 MG/1
20 CAPSULE, DELAYED RELEASE ORAL DAILY
Qty: 30 CAPSULE | Refills: 0 | Status: SHIPPED | OUTPATIENT
Start: 2018-05-15 | End: 2018-06-12 | Stop reason: SDUPTHER

## 2018-05-15 RX ORDER — METOPROLOL SUCCINATE 50 MG/1
50 TABLET, EXTENDED RELEASE ORAL DAILY
Qty: 30 TABLET | Refills: 1 | Status: SHIPPED | OUTPATIENT
Start: 2018-05-15 | End: 2018-07-06 | Stop reason: SDUPTHER

## 2018-05-15 NOTE — TELEPHONE ENCOUNTER
Rivka Parham LPN  Phone Number: [phone]     Needs refill on  omeprazole (priLOSEC) 20 Mg, to [facility] in [place]. Patient called back and wants a 30 day supply.

## 2018-06-12 DIAGNOSIS — F41.8 MIXED ANXIETY AND DEPRESSIVE DISORDER: Chronic | ICD-10-CM

## 2018-06-12 DIAGNOSIS — K21.9 GASTROESOPHAGEAL REFLUX DISEASE WITHOUT ESOPHAGITIS: Chronic | ICD-10-CM

## 2018-06-12 RX ORDER — OMEPRAZOLE 20 MG/1
CAPSULE, DELAYED RELEASE ORAL
Qty: 30 CAPSULE | Refills: 0 | Status: SHIPPED | OUTPATIENT
Start: 2018-06-12 | End: 2018-07-06 | Stop reason: SDUPTHER

## 2018-06-12 RX ORDER — FLUOXETINE HYDROCHLORIDE 40 MG/1
CAPSULE ORAL
Qty: 90 CAPSULE | Refills: 0 | Status: SHIPPED | OUTPATIENT
Start: 2018-06-12 | End: 2018-07-06 | Stop reason: SDUPTHER

## 2018-07-02 ENCOUNTER — LAB (OUTPATIENT)
Dept: LAB | Facility: OTHER | Age: 68
End: 2018-07-02

## 2018-07-02 DIAGNOSIS — I10 ESSENTIAL HYPERTENSION: ICD-10-CM

## 2018-07-02 DIAGNOSIS — Z11.59 NEED FOR HEPATITIS C SCREENING TEST: ICD-10-CM

## 2018-07-02 DIAGNOSIS — E78.2 MIXED HYPERLIPIDEMIA: Chronic | ICD-10-CM

## 2018-07-02 DIAGNOSIS — R73.02 IMPAIRED GLUCOSE TOLERANCE: Chronic | ICD-10-CM

## 2018-07-02 LAB
ALBUMIN SERPL-MCNC: 4.2 G/DL (ref 3.5–5)
ALBUMIN/GLOB SERPL: 1.4 G/DL (ref 1.1–1.8)
ALP SERPL-CCNC: 61 U/L (ref 38–126)
ALT SERPL W P-5'-P-CCNC: 19 U/L
ANION GAP SERPL CALCULATED.3IONS-SCNC: 7 MMOL/L (ref 5–15)
AST SERPL-CCNC: 18 U/L (ref 17–59)
BASOPHILS # BLD AUTO: 0.04 10*3/MM3 (ref 0–0.2)
BASOPHILS NFR BLD AUTO: 0.5 % (ref 0–2)
BILIRUB SERPL-MCNC: 0.7 MG/DL (ref 0.2–1.3)
BILIRUB UR QL STRIP: NEGATIVE
BUN BLD-MCNC: 28 MG/DL (ref 9–20)
BUN/CREAT SERPL: 33.3 (ref 7–25)
CALCIUM SPEC-SCNC: 9 MG/DL (ref 8.4–10.2)
CHLORIDE SERPL-SCNC: 109 MMOL/L (ref 98–107)
CHOLEST SERPL-MCNC: 158 MG/DL (ref 150–200)
CLARITY UR: ABNORMAL
CO2 SERPL-SCNC: 24 MMOL/L (ref 22–30)
COLOR UR: YELLOW
CREAT BLD-MCNC: 0.84 MG/DL (ref 0.66–1.25)
DEPRECATED RDW RBC AUTO: 47 FL (ref 35.1–43.9)
EOSINOPHIL # BLD AUTO: 0.29 10*3/MM3 (ref 0–0.7)
EOSINOPHIL NFR BLD AUTO: 3.8 % (ref 0–7)
ERYTHROCYTE [DISTWIDTH] IN BLOOD BY AUTOMATED COUNT: 14.3 % (ref 11.5–14.5)
GFR SERPL CREATININE-BSD FRML MDRD: 91 ML/MIN/1.73 (ref 49–113)
GLOBULIN UR ELPH-MCNC: 3.1 GM/DL (ref 2.3–3.5)
GLUCOSE BLD-MCNC: 114 MG/DL (ref 74–99)
GLUCOSE UR STRIP-MCNC: NEGATIVE MG/DL
HBA1C MFR BLD: 6.3 % (ref 4–5.6)
HCT VFR BLD AUTO: 51.2 % (ref 39–49)
HDLC SERPL-MCNC: 32 MG/DL (ref 40–59)
HGB BLD-MCNC: 16.7 G/DL (ref 13.7–17.3)
HGB UR QL STRIP.AUTO: NEGATIVE
KETONES UR QL STRIP: NEGATIVE
LDLC SERPL CALC-MCNC: 101 MG/DL
LDLC/HDLC SERPL: 3.16 {RATIO} (ref 0–3.55)
LEUKOCYTE ESTERASE UR QL STRIP.AUTO: NEGATIVE
LYMPHOCYTES # BLD AUTO: 1.71 10*3/MM3 (ref 0.6–4.2)
LYMPHOCYTES NFR BLD AUTO: 22.4 % (ref 10–50)
MCH RBC QN AUTO: 29.8 PG (ref 26.5–34)
MCHC RBC AUTO-ENTMCNC: 32.6 G/DL (ref 31.5–36.3)
MCV RBC AUTO: 91.4 FL (ref 80–98)
MONOCYTES # BLD AUTO: 0.53 10*3/MM3 (ref 0–0.9)
MONOCYTES NFR BLD AUTO: 6.9 % (ref 0–12)
NEUTROPHILS # BLD AUTO: 5.07 10*3/MM3 (ref 2–8.6)
NEUTROPHILS NFR BLD AUTO: 66.4 % (ref 37–80)
NITRITE UR QL STRIP: NEGATIVE
PH UR STRIP.AUTO: 5.5 [PH] (ref 5.5–8)
PLATELET # BLD AUTO: 197 10*3/MM3 (ref 150–450)
PMV BLD AUTO: 11.4 FL (ref 8–12)
POTASSIUM BLD-SCNC: 4 MMOL/L (ref 3.4–5)
PROT SERPL-MCNC: 7.3 G/DL (ref 6.3–8.2)
PROT UR QL STRIP: NEGATIVE
RBC # BLD AUTO: 5.6 10*6/MM3 (ref 4.37–5.74)
SODIUM BLD-SCNC: 140 MMOL/L (ref 137–145)
SP GR UR STRIP: 1.02 (ref 1–1.03)
T4 FREE SERPL-MCNC: 1.19 NG/DL (ref 0.78–2.19)
TRIGL SERPL-MCNC: 125 MG/DL
TSH SERPL DL<=0.05 MIU/L-ACNC: 2 MIU/ML (ref 0.46–4.68)
UROBILINOGEN UR QL STRIP: ABNORMAL
VLDLC SERPL-MCNC: 25 MG/DL
WBC NRBC COR # BLD: 7.64 10*3/MM3 (ref 3.2–9.8)

## 2018-07-02 PROCEDURE — 83036 HEMOGLOBIN GLYCOSYLATED A1C: CPT | Performed by: INTERNAL MEDICINE

## 2018-07-02 PROCEDURE — 85025 COMPLETE CBC W/AUTO DIFF WBC: CPT | Performed by: INTERNAL MEDICINE

## 2018-07-02 PROCEDURE — 86803 HEPATITIS C AB TEST: CPT | Performed by: INTERNAL MEDICINE

## 2018-07-02 PROCEDURE — 80053 COMPREHEN METABOLIC PANEL: CPT | Performed by: INTERNAL MEDICINE

## 2018-07-02 PROCEDURE — 80061 LIPID PANEL: CPT | Performed by: INTERNAL MEDICINE

## 2018-07-02 PROCEDURE — 81003 URINALYSIS AUTO W/O SCOPE: CPT | Performed by: INTERNAL MEDICINE

## 2018-07-02 PROCEDURE — 84439 ASSAY OF FREE THYROXINE: CPT | Performed by: INTERNAL MEDICINE

## 2018-07-02 PROCEDURE — 84443 ASSAY THYROID STIM HORMONE: CPT | Performed by: INTERNAL MEDICINE

## 2018-07-02 PROCEDURE — 36415 COLL VENOUS BLD VENIPUNCTURE: CPT | Performed by: INTERNAL MEDICINE

## 2018-07-04 LAB — HCV AB SER DONR QL: NEGATIVE

## 2018-07-06 ENCOUNTER — OFFICE VISIT (OUTPATIENT)
Dept: FAMILY MEDICINE CLINIC | Facility: CLINIC | Age: 68
End: 2018-07-06

## 2018-07-06 VITALS
HEART RATE: 60 BPM | HEIGHT: 67 IN | BODY MASS INDEX: 33.59 KG/M2 | SYSTOLIC BLOOD PRESSURE: 116 MMHG | DIASTOLIC BLOOD PRESSURE: 72 MMHG | WEIGHT: 214 LBS

## 2018-07-06 DIAGNOSIS — K21.9 GASTROESOPHAGEAL REFLUX DISEASE WITHOUT ESOPHAGITIS: Chronic | ICD-10-CM

## 2018-07-06 DIAGNOSIS — R56.9 SEIZURES (HCC): Chronic | ICD-10-CM

## 2018-07-06 DIAGNOSIS — F41.8 MIXED ANXIETY AND DEPRESSIVE DISORDER: Chronic | ICD-10-CM

## 2018-07-06 DIAGNOSIS — R73.02 IMPAIRED GLUCOSE TOLERANCE: Primary | Chronic | ICD-10-CM

## 2018-07-06 DIAGNOSIS — I10 ESSENTIAL HYPERTENSION: Chronic | ICD-10-CM

## 2018-07-06 DIAGNOSIS — E78.2 MIXED HYPERLIPIDEMIA: Chronic | ICD-10-CM

## 2018-07-06 PROCEDURE — 99214 OFFICE O/P EST MOD 30 MIN: CPT | Performed by: INTERNAL MEDICINE

## 2018-07-06 RX ORDER — LEVETIRACETAM 500 MG/1
500 TABLET ORAL 2 TIMES DAILY
Qty: 180 TABLET | Refills: 1 | Status: SHIPPED | OUTPATIENT
Start: 2018-07-06 | End: 2019-01-07

## 2018-07-06 RX ORDER — FLUOXETINE HYDROCHLORIDE 40 MG/1
40 CAPSULE ORAL DAILY
Qty: 90 CAPSULE | Refills: 1 | Status: SHIPPED | OUTPATIENT
Start: 2018-07-06 | End: 2019-01-07

## 2018-07-06 RX ORDER — METOPROLOL SUCCINATE 50 MG/1
50 TABLET, EXTENDED RELEASE ORAL DAILY
Qty: 90 TABLET | Refills: 1 | Status: SHIPPED | OUTPATIENT
Start: 2018-07-06 | End: 2018-12-21 | Stop reason: SDUPTHER

## 2018-07-06 RX ORDER — OMEPRAZOLE 20 MG/1
20 CAPSULE, DELAYED RELEASE ORAL DAILY
Qty: 90 CAPSULE | Refills: 1 | Status: SHIPPED | OUTPATIENT
Start: 2018-07-06 | End: 2019-01-07

## 2018-07-06 NOTE — PROGRESS NOTES
[facility]#[id number].

## 2018-07-06 NOTE — PROGRESS NOTES
Chief Complaint   Patient presents with   • Hyperlipidemia   • Hypertension     Subjective   Jaden Faith is a 68 y.o. male who presents to the office for follow-up and review of labs. He has impaired glucose tolerance and has been monitoring his dietary intake of sugars and carbohydrates.  He has hypertension and his blood pressure has been well controlled.  He has hyperlipidemia and controls this with diet.  He has GERD and takes omeprazole daily.  He takes Celexa for his anxiety and depression.  He has a history of seizures and takes Keppra.  He has been seizure-free.      He had a prostatectomy in February 2017. He continues to follow with urology, who manages his PSA levels.     He has a small skin lesion under the left eye.  This gets irritated at times.  He is asking about seeing someone to have this removed.    The following portions of the patient's history were reviewed and updated as appropriate: allergies, current medications, past family history, past medical history, past social history, past surgical history and problem list.    Review of Systems   Constitutional: Negative for chills, fatigue and fever.   HENT: Negative for congestion, sneezing, sore throat and trouble swallowing.    Eyes: Negative for visual disturbance.   Respiratory: Negative for cough, chest tightness, shortness of breath and wheezing.    Cardiovascular: Negative for chest pain, palpitations and leg swelling.   Gastrointestinal: Negative for abdominal pain, constipation, diarrhea, nausea and vomiting.   Genitourinary: Negative for dysuria, frequency and urgency.   Musculoskeletal: Negative for neck pain.   Skin: Negative for rash.   Neurological: Negative for dizziness, weakness and headaches.   Psychiatric/Behavioral:        Patient denies any feelings of depression and has not felt down, hopeless or lost interest in any activities.   All other systems reviewed and are negative.      Objective   Vitals:    07/06/18 0805  "  BP: 116/72   BP Location: Left arm   Patient Position: Sitting   Cuff Size: Adult   Pulse: 60   Weight: 97.1 kg (214 lb)   Height: 170.2 cm (67\")   PainSc: 0-No pain     Physical Exam   Constitutional: He is oriented to person, place, and time. He appears well-developed and well-nourished. No distress.   HENT:   Head: Normocephalic and atraumatic.   Nose: Nose normal.   Mouth/Throat: Oropharynx is clear and moist. No oropharyngeal exudate.   Eyes: Conjunctivae and EOM are normal. Pupils are equal, round, and reactive to light. No scleral icterus.   Neck: Normal range of motion. Neck supple.   Cardiovascular: Normal rate, regular rhythm and normal heart sounds.  Exam reveals no gallop and no friction rub.    No murmur heard.  Pulmonary/Chest: Effort normal and breath sounds normal. No respiratory distress. He has no wheezes. He has no rales.   Abdominal: Soft. Bowel sounds are normal. He exhibits no distension. There is no tenderness. There is no rebound and no guarding.   Musculoskeletal: Normal range of motion. He exhibits no edema.   Lymphadenopathy:     He has no cervical adenopathy.   Neurological: He is alert and oriented to person, place, and time. No cranial nerve deficit.   Skin: Skin is warm and dry. No rash noted.   He has a small skin tag appearing lesion under the left eye.   Psychiatric: He has a normal mood and affect. His behavior is normal. Judgment and thought content normal.   Nursing note and vitals reviewed.      Assessment/Plan   Jaden was seen today for hyperlipidemia and hypertension.    Diagnoses and all orders for this visit:    Impaired glucose tolerance  -     Hemoglobin A1c; Future    Essential hypertension  -     CBC & Differential; Future  -     Comprehensive Metabolic Panel; Future  -     T4, Free; Future  -     TSH; Future  -     Urinalysis With Culture If Indicated - Urine, Clean Catch; Future  -     metoprolol succinate XL (TOPROL-XL) 50 MG 24 hr tablet; Take 1 tablet by mouth " Daily.    Mixed hyperlipidemia  -     LDL Cholesterol, Direct; Future    Gastroesophageal reflux disease without esophagitis  -     omeprazole (priLOSEC) 20 MG capsule; Take 1 capsule by mouth Daily.    Mixed anxiety and depressive disorder  -     FLUoxetine (PROzac) 40 MG capsule; Take 1 capsule by mouth Daily.    Seizures (CMS/HCC)  -     levETIRAcetam (KEPPRA) 500 MG tablet; Take 1 tablet by mouth 2 (Two) Times a Day.         Labs are reviewed with patient. Patient's glucose is slightly elevated.  Patient will continue to watch diet to help maintain control of the glucose.  Patient understands that there is an increased risk of developing diabetes in the future.  His lipids are controlled and he will continue with dietary management of the hyperlipidemia.  His blood pressure is controlled, so he will continue with his current blood pressure medication.  He will continue with citalopram for treatment of the anxiety and depression.  He will also continue with lorazepam as needed.  He will continue to follow with urology secondary to the history of prostate cancer.    I have recommended that he see a plastic surgeon secondary to the location of the skin lesion under his eye.  He would like to hold off on scheduling this for now, and will call to do this on his own.  He will let me know if his insurance requires a referral and I will schedule him an appointment at that time.    Patient understands the risks associated with this controlled medication, including tolerance and addiction.  Patient also agrees to only obtain this medication from me, and not from a another provider, unless that provider is covering for me in my absence.  Patient also agrees to be compliant in dosing, and not self adjust the dose of medication.  A signed controlled substance agreement is on file, and the patient has received a controlled substance education sheet at this a previous visit.  The patient has also signed a consent for treatment  with a controlled substance as per [facility] policy. ALONDRA was obtained.    PHQ-2/PHQ-9 Depression Screening 7/6/2018   Little interest or pleasure in doing things 0   Feeling down, depressed, or hopeless 0   Trouble falling or staying asleep, or sleeping too much -   Feeling tired or having little energy -   Poor appetite or overeating -   Feeling bad about yourself - or that you are a failure or have let yourself or your family down -   Trouble concentrating on things, such as reading the newspaper or watching television -   Moving or speaking so slowly that other people could have noticed. Or the opposite - being so fidgety or restless that you have been moving around a lot more than usual -   Thoughts that you would be better off dead, or of hurting yourself in some way -   Total Score 0   If you checked off any problems, how difficult have these problems made it for you to do your work, take care of things at home, or get along with other people? -         Lab on 07/02/2018   Component Date Value Ref Range Status   • Glucose 07/02/2018 114* 74 - 99 mg/dL Final   • BUN 07/02/2018 28* 9 - 20 mg/dL Final   • Creatinine 07/02/2018 0.84  0.66 - 1.25 mg/dL Final   • Sodium 07/02/2018 140  137 - 145 mmol/L Final   • Potassium 07/02/2018 4.0  3.4 - 5.0 mmol/L Final   • Chloride 07/02/2018 109* 98 - 107 mmol/L Final   • CO2 07/02/2018 24.0  22.0 - 30.0 mmol/L Final   • Calcium 07/02/2018 9.0  8.4 - 10.2 mg/dL Final   • Total Protein 07/02/2018 7.3  6.3 - 8.2 g/dL Final   • Albumin 07/02/2018 4.20  3.50 - 5.00 g/dL Final   • ALT (SGPT) 07/02/2018 19  <=50 U/L Final   • AST (SGOT) 07/02/2018 18  17 - 59 U/L Final   • Alkaline Phosphatase 07/02/2018 61  38 - 126 U/L Final   • Total Bilirubin 07/02/2018 0.7  0.2 - 1.3 mg/dL Final   • eGFR Non African Amer 07/02/2018 91  49 - 113 mL/min/1.73 Final   • Globulin 07/02/2018 3.1  2.3 - 3.5 gm/dL Final   • A/G Ratio 07/02/2018 1.4  1.1 - 1.8 g/dL Final   • BUN/Creatinine  Ratio 07/02/2018 33.3* 7.0 - 25.0 Final   • Anion Gap 07/02/2018 7.0  5.0 - 15.0 mmol/L Final   • Hemoglobin A1C 07/02/2018 6.3* 4 - 5.6 % Final   • Total Cholesterol 07/02/2018 158  150 - 200 mg/dL Final   • Triglycerides 07/02/2018 125  <=150 mg/dL Final   • HDL Cholesterol 07/02/2018 32* 40 - 59 mg/dL Final   • LDL Cholesterol  07/02/2018 101* <=100 mg/dL Final   • VLDL Cholesterol 07/02/2018 25  mg/dL Final   • LDL/HDL Ratio 07/02/2018 3.16  0.00 - 3.55 Final   • Free T4 07/02/2018 1.19  0.78 - 2.19 ng/dL Final   • TSH 07/02/2018 2.000  0.460 - 4.680 mIU/mL Final   • Color, UA 07/02/2018 Yellow  Yellow, Straw Final   • Appearance, UA 07/02/2018 Slightly Cloudy* Clear Final   • pH, UA 07/02/2018 5.5  5.5 - 8.0 Final   • Specific Gravity, UA 07/02/2018 1.025  1.005 - 1.030 Final   • Glucose, UA 07/02/2018 Negative  Negative Final   • Ketones, UA 07/02/2018 Negative  Negative Final   • Bilirubin, UA 07/02/2018 Negative  Negative Final   • Blood, UA 07/02/2018 Negative  Negative Final   • Protein, UA 07/02/2018 Negative  Negative Final   • Leuk Esterase, UA 07/02/2018 Negative  Negative Final   • Nitrite, UA 07/02/2018 Negative  Negative Final   • Urobilinogen, UA 07/02/2018 0.2 E.U./dL  0.2 - 1.0 E.U./dL Final   • Hepatitis C Ab 07/02/2018 Negative  Negative Final   • WBC 07/02/2018 7.64  3.20 - 9.80 10*3/mm3 Final   • RBC 07/02/2018 5.60  4.37 - 5.74 10*6/mm3 Final   • Hemoglobin 07/02/2018 16.7  13.7 - 17.3 g/dL Final   • Hematocrit 07/02/2018 51.2* 39.0 - 49.0 % Final   • MCV 07/02/2018 91.4  80.0 - 98.0 fL Final   • MCH 07/02/2018 29.8  26.5 - 34.0 pg Final   • MCHC 07/02/2018 32.6  31.5 - 36.3 g/dL Final   • RDW 07/02/2018 14.3  11.5 - 14.5 % Final   • RDW-SD 07/02/2018 47.0* 35.1 - 43.9 fl Final   • MPV 07/02/2018 11.4  8.0 - 12.0 fL Final   • Platelets 07/02/2018 197  150 - 450 10*3/mm3 Final   • Neutrophil % 07/02/2018 66.4  37.0 - 80.0 % Final   • Lymphocyte % 07/02/2018 22.4  10.0 - 50.0 % Final   •  Monocyte % 07/02/2018 6.9  0.0 - 12.0 % Final   • Eosinophil % 07/02/2018 3.8  0.0 - 7.0 % Final   • Basophil % 07/02/2018 0.5  0.0 - 2.0 % Final   • Neutrophils, Absolute 07/02/2018 5.07  2.00 - 8.60 10*3/mm3 Final   • Lymphocytes, Absolute 07/02/2018 1.71  0.60 - 4.20 10*3/mm3 Final   • Monocytes, Absolute 07/02/2018 0.53  0.00 - 0.90 10*3/mm3 Final   • Eosinophils, Absolute 07/02/2018 0.29  0.00 - 0.70 10*3/mm3 Final   • Basophils, Absolute 07/02/2018 0.04  0.00 - 0.20 10*3/mm3 Final   ]

## 2018-08-17 DIAGNOSIS — K21.9 GASTROESOPHAGEAL REFLUX DISEASE WITHOUT ESOPHAGITIS: Chronic | ICD-10-CM

## 2018-08-17 RX ORDER — OMEPRAZOLE 20 MG/1
20 CAPSULE, DELAYED RELEASE ORAL DAILY
Qty: 30 CAPSULE | Refills: 5 | Status: SHIPPED | OUTPATIENT
Start: 2018-08-17 | End: 2019-01-07

## 2018-12-21 DIAGNOSIS — I10 ESSENTIAL HYPERTENSION: Chronic | ICD-10-CM

## 2018-12-21 RX ORDER — METOPROLOL SUCCINATE 50 MG/1
50 TABLET, EXTENDED RELEASE ORAL DAILY
Qty: 30 TABLET | Refills: 0 | Status: SHIPPED | OUTPATIENT
Start: 2018-12-21 | End: 2019-01-07

## 2019-01-02 ENCOUNTER — LAB (OUTPATIENT)
Dept: LAB | Facility: OTHER | Age: 69
End: 2019-01-02

## 2019-01-02 DIAGNOSIS — E78.2 MIXED HYPERLIPIDEMIA: Chronic | ICD-10-CM

## 2019-01-02 DIAGNOSIS — I10 ESSENTIAL HYPERTENSION: ICD-10-CM

## 2019-01-02 DIAGNOSIS — R73.02 IMPAIRED GLUCOSE TOLERANCE: Chronic | ICD-10-CM

## 2019-01-02 LAB
ALBUMIN SERPL-MCNC: 4.3 G/DL (ref 3.5–5)
ALBUMIN/GLOB SERPL: 1.3 G/DL (ref 1.1–1.8)
ALP SERPL-CCNC: 82 U/L (ref 38–126)
ALT SERPL W P-5'-P-CCNC: 22 U/L
ANION GAP SERPL CALCULATED.3IONS-SCNC: 8 MMOL/L (ref 5–15)
ARTICHOKE IGE QN: 103 MG/DL (ref 1–129)
AST SERPL-CCNC: 23 U/L (ref 17–59)
BASOPHILS # BLD AUTO: 0.05 10*3/MM3 (ref 0–0.2)
BASOPHILS NFR BLD AUTO: 0.6 % (ref 0–2)
BILIRUB SERPL-MCNC: 1 MG/DL (ref 0.2–1.3)
BILIRUB UR QL STRIP: NEGATIVE
BUN BLD-MCNC: 29 MG/DL (ref 9–20)
BUN/CREAT SERPL: 30.2 (ref 7–25)
CALCIUM SPEC-SCNC: 9.3 MG/DL (ref 8.4–10.2)
CHLORIDE SERPL-SCNC: 108 MMOL/L (ref 98–107)
CLARITY UR: CLEAR
CO2 SERPL-SCNC: 27 MMOL/L (ref 22–30)
COLOR UR: YELLOW
CREAT BLD-MCNC: 0.96 MG/DL (ref 0.66–1.25)
DEPRECATED RDW RBC AUTO: 46.1 FL (ref 35.1–43.9)
EOSINOPHIL # BLD AUTO: 0.23 10*3/MM3 (ref 0–0.7)
EOSINOPHIL NFR BLD AUTO: 2.7 % (ref 0–7)
ERYTHROCYTE [DISTWIDTH] IN BLOOD BY AUTOMATED COUNT: 14 % (ref 11.5–14.5)
GFR SERPL CREATININE-BSD FRML MDRD: 78 ML/MIN/1.73 (ref 49–113)
GLOBULIN UR ELPH-MCNC: 3.2 GM/DL (ref 2.3–3.5)
GLUCOSE BLD-MCNC: 125 MG/DL (ref 74–99)
GLUCOSE UR STRIP-MCNC: NEGATIVE MG/DL
HBA1C MFR BLD: 6.4 % (ref 4–5.6)
HCT VFR BLD AUTO: 53 % (ref 39–49)
HGB BLD-MCNC: 17.3 G/DL (ref 13.7–17.3)
HGB UR QL STRIP.AUTO: NEGATIVE
KETONES UR QL STRIP: NEGATIVE
LEUKOCYTE ESTERASE UR QL STRIP.AUTO: NEGATIVE
LYMPHOCYTES # BLD AUTO: 1.93 10*3/MM3 (ref 0.6–4.2)
LYMPHOCYTES NFR BLD AUTO: 22.6 % (ref 10–50)
MCH RBC QN AUTO: 29.7 PG (ref 26.5–34)
MCHC RBC AUTO-ENTMCNC: 32.6 G/DL (ref 31.5–36.3)
MCV RBC AUTO: 90.9 FL (ref 80–98)
MONOCYTES # BLD AUTO: 0.66 10*3/MM3 (ref 0–0.9)
MONOCYTES NFR BLD AUTO: 7.7 % (ref 0–12)
NEUTROPHILS # BLD AUTO: 5.68 10*3/MM3 (ref 2–8.6)
NEUTROPHILS NFR BLD AUTO: 66.4 % (ref 37–80)
NITRITE UR QL STRIP: NEGATIVE
PH UR STRIP.AUTO: 6.5 [PH] (ref 5.5–8)
PLATELET # BLD AUTO: 216 10*3/MM3 (ref 150–450)
PMV BLD AUTO: 11.4 FL (ref 8–12)
POTASSIUM BLD-SCNC: 4.7 MMOL/L (ref 3.4–5)
PROT SERPL-MCNC: 7.5 G/DL (ref 6.3–8.2)
PROT UR QL STRIP: ABNORMAL
RBC # BLD AUTO: 5.83 10*6/MM3 (ref 4.37–5.74)
SODIUM BLD-SCNC: 143 MMOL/L (ref 137–145)
SP GR UR STRIP: 1.02 (ref 1–1.03)
T4 FREE SERPL-MCNC: 1.05 NG/DL (ref 0.78–2.19)
TSH SERPL DL<=0.05 MIU/L-ACNC: 1.85 MIU/ML (ref 0.46–4.68)
UROBILINOGEN UR QL STRIP: ABNORMAL
WBC NRBC COR # BLD: 8.55 10*3/MM3 (ref 3.2–9.8)

## 2019-01-02 PROCEDURE — 36415 COLL VENOUS BLD VENIPUNCTURE: CPT | Performed by: INTERNAL MEDICINE

## 2019-01-02 PROCEDURE — 85025 COMPLETE CBC W/AUTO DIFF WBC: CPT | Performed by: INTERNAL MEDICINE

## 2019-01-02 PROCEDURE — 84443 ASSAY THYROID STIM HORMONE: CPT | Performed by: INTERNAL MEDICINE

## 2019-01-02 PROCEDURE — 83721 ASSAY OF BLOOD LIPOPROTEIN: CPT | Performed by: INTERNAL MEDICINE

## 2019-01-02 PROCEDURE — 80053 COMPREHEN METABOLIC PANEL: CPT | Performed by: INTERNAL MEDICINE

## 2019-01-02 PROCEDURE — 84439 ASSAY OF FREE THYROXINE: CPT | Performed by: INTERNAL MEDICINE

## 2019-01-02 PROCEDURE — 83036 HEMOGLOBIN GLYCOSYLATED A1C: CPT | Performed by: INTERNAL MEDICINE

## 2019-01-02 PROCEDURE — 81003 URINALYSIS AUTO W/O SCOPE: CPT | Performed by: INTERNAL MEDICINE

## 2019-01-07 ENCOUNTER — OFFICE VISIT (OUTPATIENT)
Dept: FAMILY MEDICINE CLINIC | Facility: CLINIC | Age: 69
End: 2019-01-07

## 2019-01-07 VITALS
DIASTOLIC BLOOD PRESSURE: 88 MMHG | HEIGHT: 67 IN | SYSTOLIC BLOOD PRESSURE: 134 MMHG | BODY MASS INDEX: 34.21 KG/M2 | WEIGHT: 218 LBS | TEMPERATURE: 97 F | HEART RATE: 61 BPM
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
## 2019-01-07 DIAGNOSIS — F41.8 MIXED ANXIETY AND DEPRESSIVE DISORDER: Chronic | ICD-10-CM

## 2019-01-07 DIAGNOSIS — K21.9 GASTROESOPHAGEAL REFLUX DISEASE WITHOUT ESOPHAGITIS: Chronic | ICD-10-CM

## 2019-01-07 DIAGNOSIS — E78.2 MIXED HYPERLIPIDEMIA: Chronic | ICD-10-CM

## 2019-01-07 DIAGNOSIS — Z00.00 MEDICARE ANNUAL WELLNESS VISIT, SUBSEQUENT: Primary | ICD-10-CM

## 2019-01-07 DIAGNOSIS — I73.9 PERIPHERAL VASCULAR DISEASE (HCC): Chronic | ICD-10-CM

## 2019-01-07 DIAGNOSIS — R73.02 IMPAIRED GLUCOSE TOLERANCE: Chronic | ICD-10-CM

## 2019-01-07 DIAGNOSIS — C61 PROSTATE CANCER (HCC): Chronic | ICD-10-CM

## 2019-01-07 DIAGNOSIS — R56.9 SEIZURES (HCC): Chronic | ICD-10-CM

## 2019-01-07 DIAGNOSIS — I10 ESSENTIAL HYPERTENSION: Chronic | ICD-10-CM

## 2019-01-07 DIAGNOSIS — F41.0 PANIC ATTACKS: Chronic | ICD-10-CM

## 2019-01-07 PROCEDURE — 99214 OFFICE O/P EST MOD 30 MIN: CPT | Performed by: INTERNAL MEDICINE

## 2019-01-07 PROCEDURE — G0439 PPPS, SUBSEQ VISIT: HCPCS | Performed by: INTERNAL MEDICINE

## 2019-01-07 RX ORDER — TRIAMCINOLONE ACETONIDE 1 MG/G
CREAM TOPICAL 2 TIMES DAILY
Qty: 60 G | Refills: 0 | Status: SHIPPED | OUTPATIENT
Start: 2019-01-07

## 2019-01-07 RX ORDER — METOPROLOL SUCCINATE 50 MG/1
50 TABLET, EXTENDED RELEASE ORAL DAILY
Qty: 90 TABLET | Refills: 1 | Status: SHIPPED | OUTPATIENT
Start: 2019-01-07 | End: 2020-01-13 | Stop reason: SDUPTHER

## 2019-01-07 RX ORDER — FLUOXETINE HYDROCHLORIDE 40 MG/1
40 CAPSULE ORAL DAILY
Qty: 90 CAPSULE | Refills: 1 | Status: SHIPPED | OUTPATIENT
Start: 2019-01-07 | End: 2019-07-11 | Stop reason: SDUPTHER

## 2019-01-07 RX ORDER — LEVETIRACETAM 500 MG/1
500 TABLET ORAL 2 TIMES DAILY
Qty: 180 TABLET | Refills: 1 | Status: SHIPPED | OUTPATIENT
Start: 2019-01-07 | End: 2019-09-25 | Stop reason: SDUPTHER

## 2019-01-07 RX ORDER — ASPIRIN 81 MG/1
81 TABLET ORAL DAILY
Qty: 30 TABLET | Refills: 0 | COMMUNITY
Start: 2019-01-07

## 2019-01-07 RX ORDER — OMEPRAZOLE 20 MG/1
20 CAPSULE, DELAYED RELEASE ORAL DAILY
Qty: 90 CAPSULE | Refills: 1 | Status: SHIPPED | OUTPATIENT
Start: 2019-01-07 | End: 2019-07-11 | Stop reason: SDUPTHER

## 2019-01-07 RX ORDER — LORAZEPAM 0.5 MG/1
0.5 TABLET ORAL EVERY 8 HOURS PRN
Qty: 20 TABLET | Refills: 0 | Status: SHIPPED | OUTPATIENT
Start: 2019-01-07 | End: 2020-12-07 | Stop reason: SDUPTHER

## 2019-01-07 NOTE — PROGRESS NOTES
QUICK REFERENCE INFORMATION:  The ABCs of the Annual Wellness Visit    Subsequent Medicare Wellness Visit    HEALTH RISK ASSESSMENT    1950    Recent Hospitalizations:  No hospitalization(s) within the last year..        Current Medical Providers:  Patient Care Team:  Ruiz Li MD as PCP - Garth Sahni MD as Consulting Physician (Neurology)  Gordon Lorenzo MD as Consulting Physician (Urology)  Ed Colunga MD as Surgeon (Otolaryngology)        Smoking Status:  Social History     Tobacco Use   Smoking Status Never Smoker   Smokeless Tobacco Never Used       Alcohol Consumption:  Social History     Substance and Sexual Activity   Alcohol Use No       Depression Screen:   PHQ-2/PHQ-9 Depression Screening 1/7/2019   Little interest or pleasure in doing things 0   Feeling down, depressed, or hopeless 0   Trouble falling or staying asleep, or sleeping too much -   Feeling tired or having little energy -   Poor appetite or overeating -   Feeling bad about yourself - or that you are a failure or have let yourself or your family down -   Trouble concentrating on things, such as reading the newspaper or watching television -   Moving or speaking so slowly that other people could have noticed. Or the opposite - being so fidgety or restless that you have been moving around a lot more than usual -   Thoughts that you would be better off dead, or of hurting yourself in some way -   Total Score 0   If you checked off any problems, how difficult have these problems made it for you to do your work, take care of things at home, or get along with other people? -       Health Habits and Functional and Cognitive Screening:  Functional & Cognitive Status 1/7/2019   Do you have difficulty preparing food and eating? No   Do you have difficulty bathing yourself, getting dressed or grooming yourself? No   Do you have difficulty using the toilet? No   Do you have difficulty moving around from place  to place? No   Do you have trouble with steps or getting out of a bed or a chair? No   In the past year have you fallen or experienced a near fall? No   Current Diet Well Balanced Diet   Dental Exam Up to date   Eye Exam Not up to date   Exercise (times per week) 3 times per week   Current Exercise Activities Include Walking   Do you need help using the phone?  No   Are you deaf or do you have serious difficulty hearing?  No   Do you need help with transportation? No   Do you need help shopping? No   Do you need help preparing meals?  No   Do you need help with housework?  No   Do you need help with laundry? No   Do you need help taking your medications? No   Do you need help managing money? No   Do you ever drive or ride in a car without wearing a seat belt? No   Have you felt unusual stress, anger or loneliness in the last month? -   Who do you live with? -   If you need help, do you have trouble finding someone available to you? -   Have you been bothered in the last four weeks by sexual problems? -   Do you have difficulty concentrating, remembering or making decisions? -           Does the patient have evidence of cognitive impairment? No    Aspirin use counseling: Does not take ASA      Recent Lab Results:  CMP:  Lab Results   Component Value Date    BUN 29 (H) 01/02/2019    CREATININE 0.96 01/02/2019    EGFRIFNONA 78 01/02/2019    BCR 30.2 (H) 01/02/2019     01/02/2019    K 4.7 01/02/2019    CO2 27.0 01/02/2019    CALCIUM 9.3 01/02/2019    ALBUMIN 4.30 01/02/2019    BILITOT 1.0 01/02/2019    ALKPHOS 82 01/02/2019    AST 23 01/02/2019    ALT 22 01/02/2019     Lipid Panel:  Lab Results   Component Value Date    CHOL 158 07/02/2018    TRIG 125 07/02/2018    HDL 32 (L) 07/02/2018    VLDL 25 07/02/2018    LDLHDL 3.16 07/02/2018     HbA1c:  Lab Results   Component Value Date    HGBA1C 6.4 (H) 01/02/2019       Visual Acuity:  No exam data present    Age-appropriate Screening Schedule:  Refer to the list below  for future screening recommendations based on patient's age, sex and/or medical conditions. Orders for these recommended tests are listed in the plan section. The patient has been provided with a written plan.    Health Maintenance   Topic Date Due   • ZOSTER VACCINE (1 of 2) 02/13/2001   • PROSTATE CANCER SCREENING  08/22/2019   • LIPID PANEL  01/02/2020   • COLONOSCOPY  06/06/2026   • TDAP/TD VACCINES (2 - Td) 11/17/2026   • INFLUENZA VACCINE  Completed   • PNEUMOCOCCAL VACCINES (65+ LOW/MEDIUM RISK)  Completed        Subjective   History of Present Illness    Jaden Faith is a 68 y.o. male who presents for an Subsequent Wellness Visit.    The following portions of the patient's history were reviewed and updated as appropriate: allergies, current medications, past family history, past medical history, past social history, past surgical history and problem list.    Outpatient Medications Prior to Visit   Medication Sig Dispense Refill   • Multiple Vitamins-Minerals (MULTIVITAMIN ADULT PO) Take 1 tablet by mouth Daily.     • FLUoxetine (PROzac) 40 MG capsule Take 1 capsule by mouth Daily. 90 capsule 1   • levETIRAcetam (KEPPRA) 500 MG tablet Take 1 tablet by mouth 2 (Two) Times a Day. 180 tablet 1   • LORazepam (ATIVAN) 0.5 MG tablet Take 1 tablet by mouth Every 8 (Eight) Hours As Needed for Anxiety or Seizures (panic attacks). 30 tablet 0   • metoprolol succinate XL (TOPROL-XL) 50 MG 24 hr tablet Take 1 tablet by mouth Daily. 30 tablet 0   • omeprazole (priLOSEC) 20 MG capsule Take 1 capsule by mouth Daily. 90 capsule 1   • omeprazole (priLOSEC) 20 MG capsule Take 1 capsule by mouth Daily. 30 capsule 5     No facility-administered medications prior to visit.        Patient Active Problem List   Diagnosis   • Mixed hyperlipidemia   • Gastroesophageal reflux disease without esophagitis   • Non-seasonal allergic rhinitis due to pollen   • Essential hypertension   • Peripheral vascular disease (CMS/HCC)   •  "Impaired glucose tolerance   • Mixed anxiety and depressive disorder   • Seizures (CMS/[facility])   • Prostate cancer (CMS/[facility])       Advance Care Planning:  has NO advance directive - not interested in additional information    Identification of Risk Factors:  Risk factors include: weight , unhealthy diet and depression.    Review of Systems    Compared to one year ago, the patient feels his physical health is the same.  Compared to one year ago, the patient feels his mental health is the same.    Objective     Physical Exam    Vitals:    01/07/19 0809   BP: 134/88   BP Location: Left arm   Patient Position: Sitting   Cuff Size: Adult   Pulse: 61   Temp: 97 °F (36.1 °C)   TempSrc: Oral   Weight: 98.9 kg (218 lb)   Height: 170.2 cm (67\")   PainSc: 0-No pain       Patient's Body mass index is 34.14 kg/m². BMI is above normal parameters. Recommendations include: educational material.      Assessment/Plan   Patient Self-Management and Personalized Health Advice  The patient has been provided with information about: diet, exercise and weight management and preventive services including:   · Diabetes screening, see lab orders, Exercise counseling provided, Fall Risk assessment done, Nutrition counseling provided.    Visit Diagnoses:    ICD-10-CM ICD-9-CM   1. Medicare annual wellness visit, subsequent Z00.00 V70.0   2. Impaired glucose tolerance R73.02 790.22   3. Essential hypertension I10 401.9   4. Mixed hyperlipidemia E78.2 272.2   5. Gastroesophageal reflux disease without esophagitis K21.9 530.81   6. Seizures (CMS/[facility]) R56.9 780.39   7. Mixed anxiety and depressive disorder F41.8 300.4   8. Panic attacks F41.0 300.01   9. Peripheral vascular disease (CMS/[facility]) I73.9 443.9   10. Insect bite, initial encounter W57.XXXA 919.4     E906.4   11. Prostate cancer (CMS/[facility]) C61 185       Orders Placed This Encounter   Procedures   • Comprehensive Metabolic Panel     Standing Status:   Future     Standing Expiration Date:   " 1/7/2020   • T4, Free     Standing Status:   Future     Standing Expiration Date:   1/7/2020   • TSH     Standing Status:   Future     Standing Expiration Date:   1/7/2020   • Urinalysis With Culture If Indicated - Urine, Clean Catch     Standing Status:   Future     Standing Expiration Date:   1/7/2020   • Hemoglobin A1c     Standing Status:   Future     Standing Expiration Date:   1/7/2020   • Lipid Panel     Standing Status:   Future     Standing Expiration Date:   1/7/2020   • CBC & Differential     Standing Status:   Future     Standing Expiration Date:   1/7/2020     Order Specific Question:   Manual Differential     Answer:   No       Outpatient Encounter Medications as of 1/7/2019   Medication Sig Dispense Refill   • FLUoxetine (PROzac) 40 MG capsule Take 1 capsule by mouth Daily. 90 capsule 1   • levETIRAcetam (KEPPRA) 500 MG tablet Take 1 tablet by mouth 2 (Two) Times a Day. 180 tablet 1   • LORazepam (ATIVAN) 0.5 MG tablet Take 1 tablet by mouth Every 8 (Eight) Hours As Needed for Anxiety or Seizures (panic attacks). 20 tablet 0   • metoprolol succinate XL (TOPROL-XL) 50 MG 24 hr tablet Take 1 tablet by mouth Daily. 90 tablet 1   • Multiple Vitamins-Minerals (MULTIVITAMIN ADULT PO) Take 1 tablet by mouth Daily.     • omeprazole (priLOSEC) 20 MG capsule Take 1 capsule by mouth Daily. 90 capsule 1   • [DISCONTINUED] FLUoxetine (PROzac) 40 MG capsule Take 1 capsule by mouth Daily. 90 capsule 1   • [DISCONTINUED] levETIRAcetam (KEPPRA) 500 MG tablet Take 1 tablet by mouth 2 (Two) Times a Day. 180 tablet 1   • [DISCONTINUED] LORazepam (ATIVAN) 0.5 MG tablet Take 1 tablet by mouth Every 8 (Eight) Hours As Needed for Anxiety or Seizures (panic attacks). 30 tablet 0   • [DISCONTINUED] metoprolol succinate XL (TOPROL-XL) 50 MG 24 hr tablet Take 1 tablet by mouth Daily. 30 tablet 0   • [DISCONTINUED] omeprazole (priLOSEC) 20 MG capsule Take 1 capsule by mouth Daily. 90 capsule 1   • [DISCONTINUED] omeprazole  (priLOSEC) 20 MG capsule Take 1 capsule by mouth Daily. 30 capsule 5   • aspirin 81 MG EC tablet Take 1 tablet by mouth Daily. 30 tablet 0   • triamcinolone (KENALOG) 0.1 % cream Apply  topically to the appropriate area as directed 2 (Two) Times a Day. 60 g 0     No facility-administered encounter medications on file as of 1/7/2019.        Reviewed use of high risk medication in the elderly: yes  Reviewed for potential of harmful drug interactions in the elderly: yes    Follow Up:  Return in about 6 months (around 7/7/2019) for Next scheduled follow up, Or sooner as needed With Labs prior to appointment.     An After Visit Summary and PPPS with all of these plans were given to the patient.

## 2019-01-07 NOTE — PROGRESS NOTES
Chief Complaint   Patient presents with   • Medicare Wellness-subsequent   • Hypertension     6 mo f/u with labs   • Anxiety     Subjective   Jaden Faith is a 68 y.o. male who presents to the office for follow-up and review of labs. He has impaired glucose tolerance and has been monitoring his dietary intake of sugars and carbohydrates.  He has hypertension and his blood pressure has been doing well.  He has hyperlipidemia and controls this with diet.  He has GERD and takes omeprazole daily.  He takes Celexa for his anxiety and depression.  He has a history of panic attacks and keeps lorazepam on hand to use as needed.  He has not had to take a dose in several years.  His current tablets have turned to powder, and he is requesting a refill today.  He has a history of seizures and takes Keppra.  He has been seizure-free.  He has a history of mild peripheral vascular disease.  He had a carotid ultrasound a number of years ago which showed mild plaque formation in the carotids.    He had a prostatectomy in February 2017. He continues to follow with urology, who manages his PSA levels.     He complains of a few skin lesions on his legs.  These occur from time to time and usually resolve after approximately one week.  They itch when present.  He thinks he is getting some type of insect bite when outside.    The following portions of the patient's history were reviewed and updated as appropriate: allergies, current medications, past family history, past medical history, past social history, past surgical history and problem list.    Review of Systems   Constitutional: Negative for chills, fatigue and fever.   HENT: Negative for congestion, sneezing, sore throat and trouble swallowing.    Eyes: Negative for visual disturbance.   Respiratory: Negative for cough, chest tightness, shortness of breath and wheezing.    Cardiovascular: Negative for chest pain, palpitations and leg swelling.   Gastrointestinal:  "Negative for abdominal pain, constipation, diarrhea, nausea and vomiting.   Genitourinary: Negative for dysuria, frequency and urgency.   Musculoskeletal: Negative for neck pain.   Skin: Positive for rash.   Neurological: Negative for dizziness, weakness and headaches.   Psychiatric/Behavioral:        Patient denies any feelings of depression and has not felt down, hopeless or lost interest in any activities.   All other systems reviewed and are negative.      Objective   Vitals:    01/07/19 0809   BP: 134/88   BP Location: Left arm   Patient Position: Sitting   Cuff Size: Adult   Pulse: 61   Temp: 97 °F (36.1 °C)   TempSrc: Oral   Weight: 98.9 kg (218 lb)   Height: 170.2 cm (67\")   PainSc: 0-No pain     Physical Exam   Constitutional: He is oriented to person, place, and time. He appears well-developed and well-nourished. No distress.   HENT:   Head: Normocephalic and atraumatic.   Nose: Nose normal.   Mouth/Throat: Oropharynx is clear and moist. No oropharyngeal exudate.   Eyes: Conjunctivae and EOM are normal. Pupils are equal, round, and reactive to light. No scleral icterus.   Neck: Normal range of motion. Neck supple.   Cardiovascular: Normal rate, regular rhythm and normal heart sounds. Exam reveals no gallop and no friction rub.   No murmur heard.  Pulmonary/Chest: Effort normal and breath sounds normal. No respiratory distress. He has no wheezes. He has no rales.   Abdominal: Soft. Bowel sounds are normal. He exhibits no distension. There is no tenderness. There is no rebound and no guarding.   Musculoskeletal: Normal range of motion. He exhibits no edema.   Lymphadenopathy:     He has no cervical adenopathy.   Neurological: He is alert and oriented to person, place, and time. No cranial nerve deficit.   Skin: Skin is warm and dry. Rash noted.   He has a couple of down-sized circular erythematous lesions present around the right lower leg and ankles.  There is no drainage noted. There is no increased " warmth.   Psychiatric: He has a normal mood and affect. His behavior is normal. Judgment and thought content normal.   Nursing note and vitals reviewed.      Assessment/Plan   Jaden was seen today for medicare wellness-subsequent, hypertension and anxiety.    Diagnoses and all orders for this visit:    Medicare annual wellness visit, subsequent    Impaired glucose tolerance  -     Hemoglobin A1c; Future    Essential hypertension  -     CBC & Differential; Future  -     Comprehensive Metabolic Panel; Future  -     T4, Free; Future  -     TSH; Future  -     Urinalysis With Culture If Indicated - Urine, Clean Catch; Future  -     metoprolol succinate XL (TOPROL-XL) 50 MG 24 hr tablet; Take 1 tablet by mouth Daily.    Mixed hyperlipidemia  -     Lipid Panel; Future    Gastroesophageal reflux disease without esophagitis  -     omeprazole (priLOSEC) 20 MG capsule; Take 1 capsule by mouth Daily.    Seizures (CMS/[facility])  -     levETIRAcetam (KEPPRA) 500 MG tablet; Take 1 tablet by mouth 2 (Two) Times a Day.    Mixed anxiety and depressive disorder  -     LORazepam (ATIVAN) 0.5 MG tablet; Take 1 tablet by mouth Every 8 (Eight) Hours As Needed for Anxiety or Seizures (panic attacks).  -     FLUoxetine (PROzac) 40 MG capsule; Take 1 capsule by mouth Daily.    Panic attacks  -     LORazepam (ATIVAN) 0.5 MG tablet; Take 1 tablet by mouth Every 8 (Eight) Hours As Needed for Anxiety or Seizures (panic attacks).    Peripheral vascular disease (CMS/[facility])  -     aspirin 81 MG EC tablet; Take 1 tablet by mouth Daily.    Insect bite, initial encounter  -     triamcinolone (KENALOG) 0.1 % cream; Apply  topically to the appropriate area as directed 2 (Two) Times a Day.    Prostate cancer (CMS/[facility])         Labs are reviewed with patient. Patient's glucose is slightly elevated at 125.  Patient will continue to watch diet to help maintain control of the glucose.  Patient understands that there is an increased risk of developing diabetes in the  future.  His LDL looks good at 103, and he will continue with dietary management of the hyperlipidemia.  His blood pressure is controlled, so he will continue with his current blood pressure medication.  He will start taking an 81 mg daily dose of aspirin, and will obtain this over-the-counter.  He will continue with citalopram for treatment of the anxiety and depression.  He will also continue with lorazepam as needed.  He will continue to follow with urology secondary to the history of prostate cancer.  He is given triamcinolone cream to use topically on the skin lesions to help with the itching and skin irritation.     Patient understands the risks associated with this controlled medication, including tolerance and addiction.  Patient also agrees to only obtain this medication from me, and not from a another provider, unless that provider is covering for me in my absence.  Patient also agrees to be compliant in dosing, and not self adjust the dose of medication.  A signed controlled substance agreement is on file, and the patient has received a controlled substance education sheet at this a previous visit.  The patient has also signed a consent for treatment with a controlled substance as per [facility] policy. ALONDRA was obtained.    PHQ-2/PHQ-9 Depression Screening 1/7/2019   Little interest or pleasure in doing things 0   Feeling down, depressed, or hopeless 0   Trouble falling or staying asleep, or sleeping too much -   Feeling tired or having little energy -   Poor appetite or overeating -   Feeling bad about yourself - or that you are a failure or have let yourself or your family down -   Trouble concentrating on things, such as reading the newspaper or watching television -   Moving or speaking so slowly that other people could have noticed. Or the opposite - being so fidgety or restless that you have been moving around a lot more than usual -   Thoughts that you would be better off dead, or of hurting  yourself in some way -   Total Score 0   If you checked off any problems, how difficult have these problems made it for you to do your work, take care of things at home, or get along with other people? -         Lab on 01/02/2019   Component Date Value Ref Range Status   • Glucose 01/02/2019 125* 74 - 99 mg/dL Final   • BUN 01/02/2019 29* 9 - 20 mg/dL Final   • Creatinine 01/02/2019 0.96  0.66 - 1.25 mg/dL Final   • Sodium 01/02/2019 143  137 - 145 mmol/L Final   • Potassium 01/02/2019 4.7  3.4 - 5.0 mmol/L Final   • Chloride 01/02/2019 108* 98 - 107 mmol/L Final   • CO2 01/02/2019 27.0  22.0 - 30.0 mmol/L Final   • Calcium 01/02/2019 9.3  8.4 - 10.2 mg/dL Final   • Total Protein 01/02/2019 7.5  6.3 - 8.2 g/dL Final   • Albumin 01/02/2019 4.30  3.50 - 5.00 g/dL Final   • ALT (SGPT) 01/02/2019 22  <=50 U/L Final   • AST (SGOT) 01/02/2019 23  17 - 59 U/L Final   • Alkaline Phosphatase 01/02/2019 82  38 - 126 U/L Final   • Total Bilirubin 01/02/2019 1.0  0.2 - 1.3 mg/dL Final   • eGFR Non African Amer 01/02/2019 78  49 - 113 mL/min/1.73 Final   • Globulin 01/02/2019 3.2  2.3 - 3.5 gm/dL Final   • A/G Ratio 01/02/2019 1.3  1.1 - 1.8 g/dL Final   • BUN/Creatinine Ratio 01/02/2019 30.2* 7.0 - 25.0 Final   • Anion Gap 01/02/2019 8.0  5.0 - 15.0 mmol/L Final   • Hemoglobin A1C 01/02/2019 6.4* 4 - 5.6 % Final   • LDL Cholesterol  01/02/2019 103  1 - 129 mg/dL Final   • Free T4 01/02/2019 1.05  0.78 - 2.19 ng/dL Final   • TSH 01/02/2019 1.850  0.460 - 4.680 mIU/mL Final   • Color, UA 01/02/2019 Yellow  Yellow, Straw Final   • Appearance, UA 01/02/2019 Clear  Clear Final   • pH, UA 01/02/2019 6.5  5.5 - 8.0 Final   • Specific Gravity, UA 01/02/2019 1.020  1.005 - 1.030 Final   • Glucose, UA 01/02/2019 Negative  Negative Final   • Ketones, UA 01/02/2019 Negative  Negative Final   • Bilirubin, UA 01/02/2019 Negative  Negative Final   • Blood, UA 01/02/2019 Negative  Negative Final   • Protein, UA 01/02/2019 Trace* Negative Final    • Leuk Esterase, UA 01/02/2019 Negative  Negative Final   • Nitrite, UA 01/02/2019 Negative  Negative Final   • Urobilinogen, UA 01/02/2019 0.2 E.U./dL  0.2 - 1.0 E.U./dL Final   • WBC 01/02/2019 8.55  3.20 - 9.80 10*3/mm3 Final   • RBC 01/02/2019 5.83* 4.37 - 5.74 10*6/mm3 Final   • Hemoglobin 01/02/2019 17.3  13.7 - 17.3 g/dL Final   • Hematocrit 01/02/2019 53.0* 39.0 - 49.0 % Final   • MCV 01/02/2019 90.9  80.0 - 98.0 fL Final   • MCH 01/02/2019 29.7  26.5 - 34.0 pg Final   • MCHC 01/02/2019 32.6  31.5 - 36.3 g/dL Final   • RDW 01/02/2019 14.0  11.5 - 14.5 % Final   • RDW-SD 01/02/2019 46.1* 35.1 - 43.9 fl Final   • MPV 01/02/2019 11.4  8.0 - 12.0 fL Final   • Platelets 01/02/2019 216  150 - 450 10*3/mm3 Final   • Neutrophil % 01/02/2019 66.4  37.0 - 80.0 % Final   • Lymphocyte % 01/02/2019 22.6  10.0 - 50.0 % Final   • Monocyte % 01/02/2019 7.7  0.0 - 12.0 % Final   • Eosinophil % 01/02/2019 2.7  0.0 - 7.0 % Final   • Basophil % 01/02/2019 0.6  0.0 - 2.0 % Final   • Neutrophils, Absolute 01/02/2019 5.68  2.00 - 8.60 10*3/mm3 Final   • Lymphocytes, Absolute 01/02/2019 1.93  0.60 - 4.20 10*3/mm3 Final   • Monocytes, Absolute 01/02/2019 0.66  0.00 - 0.90 10*3/mm3 Final   • Eosinophils, Absolute 01/02/2019 0.23  0.00 - 0.70 10*3/mm3 Final   • Basophils, Absolute 01/02/2019 0.05  0.00 - 0.20 10*3/mm3 Final   ].

## 2019-01-07 NOTE — PROGRESS NOTES
[facility]#  [id number].

## 2019-07-05 ENCOUNTER — LAB (OUTPATIENT)
Dept: LAB | Facility: OTHER | Age: 69
End: 2019-07-05

## 2019-07-05 DIAGNOSIS — R73.02 IMPAIRED GLUCOSE TOLERANCE: Chronic | ICD-10-CM

## 2019-07-05 DIAGNOSIS — E78.2 MIXED HYPERLIPIDEMIA: Chronic | ICD-10-CM

## 2019-07-05 DIAGNOSIS — I10 ESSENTIAL HYPERTENSION: ICD-10-CM

## 2019-07-05 LAB
ALBUMIN SERPL-MCNC: 4.1 G/DL (ref 3.5–5)
ALBUMIN/GLOB SERPL: 1.2 G/DL (ref 1.1–1.8)
ALP SERPL-CCNC: 113 U/L (ref 38–126)
ALT SERPL W P-5'-P-CCNC: 22 U/L
ANION GAP SERPL CALCULATED.3IONS-SCNC: 10 MMOL/L (ref 5–15)
AST SERPL-CCNC: 24 U/L (ref 17–59)
BACTERIA UR QL AUTO: ABNORMAL /HPF
BASOPHILS # BLD AUTO: 0.02 10*3/MM3 (ref 0–0.2)
BASOPHILS NFR BLD AUTO: 0.2 % (ref 0–1.5)
BILIRUB SERPL-MCNC: 1.4 MG/DL (ref 0.2–1.3)
BILIRUB UR QL STRIP: NEGATIVE
BUN BLD-MCNC: 19 MG/DL (ref 7–23)
BUN/CREAT SERPL: 23.2 (ref 7–25)
CALCIUM SPEC-SCNC: 9.5 MG/DL (ref 8.4–10.2)
CHLORIDE SERPL-SCNC: 107 MMOL/L (ref 101–112)
CHOLEST SERPL-MCNC: 79 MG/DL (ref 150–200)
CLARITY UR: CLEAR
CO2 SERPL-SCNC: 27 MMOL/L (ref 22–30)
COLOR UR: YELLOW
CREAT BLD-MCNC: 0.82 MG/DL (ref 0.7–1.3)
DEPRECATED RDW RBC AUTO: 46.6 FL (ref 37–54)
EOSINOPHIL # BLD AUTO: 0.34 10*3/MM3 (ref 0–0.4)
EOSINOPHIL NFR BLD AUTO: 3.5 % (ref 0.3–6.2)
ERYTHROCYTE [DISTWIDTH] IN BLOOD BY AUTOMATED COUNT: 14.8 % (ref 12.3–15.4)
GFR SERPL CREATININE-BSD FRML MDRD: 93 ML/MIN/1.73 (ref 49–113)
GLOBULIN UR ELPH-MCNC: 3.3 GM/DL (ref 2.3–3.5)
GLUCOSE BLD-MCNC: 124 MG/DL (ref 70–99)
GLUCOSE UR STRIP-MCNC: NEGATIVE MG/DL
HBA1C MFR BLD: 6.7 % (ref 4.8–5.6)
HCT VFR BLD AUTO: 48.1 % (ref 37.5–51)
HDLC SERPL-MCNC: 33 MG/DL (ref 40–59)
HGB BLD-MCNC: 15.7 G/DL (ref 13–17.7)
HGB UR QL STRIP.AUTO: NEGATIVE
HYALINE CASTS UR QL AUTO: ABNORMAL /LPF
KETONES UR QL STRIP: NEGATIVE
LDLC SERPL CALC-MCNC: 32 MG/DL
LDLC/HDLC SERPL: 0.98 {RATIO} (ref 0–3.55)
LEUKOCYTE ESTERASE UR QL STRIP.AUTO: NEGATIVE
LYMPHOCYTES # BLD AUTO: 1.71 10*3/MM3 (ref 0.7–3.1)
LYMPHOCYTES NFR BLD AUTO: 17.6 % (ref 19.6–45.3)
MCH RBC QN AUTO: 28.2 PG (ref 26.6–33)
MCHC RBC AUTO-ENTMCNC: 32.6 G/DL (ref 31.5–35.7)
MCV RBC AUTO: 86.4 FL (ref 79–97)
MONOCYTES # BLD AUTO: 0.57 10*3/MM3 (ref 0.1–0.9)
MONOCYTES NFR BLD AUTO: 5.9 % (ref 5–12)
MUCOUS THREADS URNS QL MICRO: ABNORMAL /HPF
NEUTROPHILS # BLD AUTO: 7.09 10*3/MM3 (ref 1.7–7)
NEUTROPHILS NFR BLD AUTO: 72.8 % (ref 42.7–76)
NITRITE UR QL STRIP: NEGATIVE
PH UR STRIP.AUTO: 6.5 [PH] (ref 5.5–8)
PLATELET # BLD AUTO: 212 10*3/MM3 (ref 140–450)
PMV BLD AUTO: 11.5 FL (ref 6–12)
POTASSIUM BLD-SCNC: 4.6 MMOL/L (ref 3.4–5)
PROT SERPL-MCNC: 7.4 G/DL (ref 6.3–8.6)
PROT UR QL STRIP: ABNORMAL
RBC # BLD AUTO: 5.57 10*6/MM3 (ref 4.14–5.8)
RBC # UR: ABNORMAL /HPF
REF LAB TEST METHOD: ABNORMAL
SODIUM BLD-SCNC: 144 MMOL/L (ref 137–145)
SP GR UR STRIP: 1.02 (ref 1–1.03)
SQUAMOUS #/AREA URNS HPF: ABNORMAL /HPF
T4 FREE SERPL-MCNC: 1.15 NG/DL (ref 0.93–1.7)
TRIGL SERPL-MCNC: 69 MG/DL
TSH SERPL DL<=0.05 MIU/L-ACNC: 2.06 MIU/ML (ref 0.27–4.2)
UROBILINOGEN UR QL STRIP: ABNORMAL
VLDLC SERPL-MCNC: 13.8 MG/DL
WBC NRBC COR # BLD: 9.73 10*3/MM3 (ref 3.4–10.8)
WBC UR QL AUTO: ABNORMAL /HPF

## 2019-07-05 PROCEDURE — 84443 ASSAY THYROID STIM HORMONE: CPT | Performed by: INTERNAL MEDICINE

## 2019-07-05 PROCEDURE — 84439 ASSAY OF FREE THYROXINE: CPT | Performed by: INTERNAL MEDICINE

## 2019-07-05 PROCEDURE — 80053 COMPREHEN METABOLIC PANEL: CPT | Performed by: INTERNAL MEDICINE

## 2019-07-05 PROCEDURE — 81001 URINALYSIS AUTO W/SCOPE: CPT | Performed by: INTERNAL MEDICINE

## 2019-07-05 PROCEDURE — 85025 COMPLETE CBC W/AUTO DIFF WBC: CPT | Performed by: INTERNAL MEDICINE

## 2019-07-05 PROCEDURE — 83036 HEMOGLOBIN GLYCOSYLATED A1C: CPT | Performed by: INTERNAL MEDICINE

## 2019-07-05 PROCEDURE — 36415 COLL VENOUS BLD VENIPUNCTURE: CPT | Performed by: INTERNAL MEDICINE

## 2019-07-05 PROCEDURE — 80061 LIPID PANEL: CPT | Performed by: INTERNAL MEDICINE

## 2019-07-11 ENCOUNTER — OFFICE VISIT (OUTPATIENT)
Dept: FAMILY MEDICINE CLINIC | Facility: CLINIC | Age: 69
End: 2019-07-11

## 2019-07-11 VITALS
WEIGHT: 211 LBS | BODY MASS INDEX: 33.12 KG/M2 | HEIGHT: 67 IN | DIASTOLIC BLOOD PRESSURE: 78 MMHG | TEMPERATURE: 98.2 F | HEART RATE: 62 BPM | SYSTOLIC BLOOD PRESSURE: 114 MMHG

## 2019-07-11 DIAGNOSIS — R73.02 IMPAIRED GLUCOSE TOLERANCE: Primary | Chronic | ICD-10-CM

## 2019-07-11 DIAGNOSIS — F41.8 MIXED ANXIETY AND DEPRESSIVE DISORDER: Chronic | ICD-10-CM

## 2019-07-11 DIAGNOSIS — R56.9 SEIZURES (HCC): Chronic | ICD-10-CM

## 2019-07-11 DIAGNOSIS — E78.2 MIXED HYPERLIPIDEMIA: Chronic | ICD-10-CM

## 2019-07-11 DIAGNOSIS — F41.1 GENERALIZED ANXIETY DISORDER: Chronic | ICD-10-CM

## 2019-07-11 DIAGNOSIS — F33.42 MAJOR DEPRESSION, RECURRENT, FULL REMISSION (HCC): Chronic | ICD-10-CM

## 2019-07-11 DIAGNOSIS — I10 ESSENTIAL HYPERTENSION: Chronic | ICD-10-CM

## 2019-07-11 DIAGNOSIS — K21.9 GASTROESOPHAGEAL REFLUX DISEASE WITHOUT ESOPHAGITIS: Chronic | ICD-10-CM

## 2019-07-11 PROBLEM — I25.10 CORONARY ARTERY DISEASE INVOLVING NATIVE CORONARY ARTERY OF NATIVE HEART WITHOUT ANGINA PECTORIS: Chronic | Status: ACTIVE | Noted: 2019-07-11

## 2019-07-11 PROCEDURE — 99214 OFFICE O/P EST MOD 30 MIN: CPT | Performed by: INTERNAL MEDICINE

## 2019-07-11 RX ORDER — ATORVASTATIN CALCIUM 80 MG/1
80 TABLET, FILM COATED ORAL EVERY EVENING
Qty: 90 TABLET | Refills: 1 | Status: SHIPPED | OUTPATIENT
Start: 2019-07-11 | End: 2020-01-13 | Stop reason: SDUPTHER

## 2019-07-11 RX ORDER — FLUOXETINE HYDROCHLORIDE 40 MG/1
40 CAPSULE ORAL DAILY
Qty: 90 CAPSULE | Refills: 1 | Status: SHIPPED | OUTPATIENT
Start: 2019-07-11 | End: 2020-01-13 | Stop reason: SDUPTHER

## 2019-07-11 RX ORDER — PRASUGREL 10 MG/1
10 TABLET, FILM COATED ORAL DAILY
COMMUNITY
End: 2020-07-13

## 2019-07-11 RX ORDER — NITROGLYCERIN 0.4 MG/1
0.4 TABLET SUBLINGUAL
COMMUNITY
Start: 2019-03-23 | End: 2020-03-23

## 2019-07-11 RX ORDER — OMEPRAZOLE 20 MG/1
20 CAPSULE, DELAYED RELEASE ORAL DAILY
Qty: 90 CAPSULE | Refills: 1 | Status: SHIPPED | OUTPATIENT
Start: 2019-07-11 | End: 2020-01-13 | Stop reason: SDUPTHER

## 2019-07-11 RX ORDER — ATORVASTATIN CALCIUM 80 MG/1
80 TABLET, FILM COATED ORAL EVERY EVENING
Refills: 11 | COMMUNITY
Start: 2019-05-23 | End: 2019-07-11 | Stop reason: SDUPTHER

## 2019-07-11 RX ORDER — LOSARTAN POTASSIUM 25 MG/1
25 TABLET ORAL DAILY
COMMUNITY
Start: 2019-04-11 | End: 2020-02-13 | Stop reason: SDUPTHER

## 2019-07-11 NOTE — PROGRESS NOTES
Chief Complaint   Patient presents with   • Hypertension     6 mo f/u with labs   • Hyperlipidemia     Subjective   Jaden Faith is a 69 y.o. male who presents to the office for follow-up and review of labs. He has impaired glucose tolerance and has been monitoring his dietary intake of sugars and carbohydrates.  He has hypertension and his blood pressure has been doing well.  He has hyperlipidemia and controls this with diet.  He has GERD and takes omeprazole daily.  He takes Celexa for his anxiety and depression.  He has panic attacks and keeps lorazepam on hand to use as needed.  He has not had to take a dose in several years.   He has seizure disorder and takes Keppra.  He has been seizure-free, and follows with neurology.  He has mild peripheral vascular disease.  He had a carotid ultrasound a number of years ago which showed mild plaque formation in the carotids. He had a prostatectomy in February 2017. He continues to follow with urology, who manages his PSA levels.     He went to the emergency room in March complaining of chest pain.  He was transferred to [facility] and had a heart catheterization which showed coronary artery disease.  He had 2 stents placed in the distal and proximal RCA.  He is currently in cardiac rehab.  He continues to follow with cardiology.  He was started on Effient for anticoagulation.    The following portions of the patient's history were reviewed and updated as appropriate: allergies, current medications, past family history, past medical history, past social history, past surgical history and problem list.    Review of Systems   Constitutional: Negative for chills, fatigue and fever.   HENT: Negative for congestion, sneezing, sore throat and trouble swallowing.    Eyes: Negative for visual disturbance.   Respiratory: Negative for cough, chest tightness, shortness of breath and wheezing.    Cardiovascular: Negative for chest pain, palpitations and leg swelling.  "  Gastrointestinal: Negative for abdominal pain, constipation, diarrhea, nausea and vomiting.   Genitourinary: Negative for dysuria, frequency and urgency.   Musculoskeletal: Negative for neck pain.   Neurological: Negative for dizziness, weakness and headaches.   Psychiatric/Behavioral:        Patient denies any feelings of depression and has not felt down, hopeless or lost interest in any activities.   All other systems reviewed and are negative.      Objective   Vitals:    07/11/19 0754   BP: 114/78   BP Location: Left arm   Patient Position: Sitting   Cuff Size: Adult   Pulse: 62   Temp: 98.2 °F (36.8 °C)   TempSrc: Oral   Weight: 95.7 kg (211 lb)   Height: 170.2 cm (67\")   PainSc: 0-No pain     Physical Exam   Constitutional: He is oriented to person, place, and time. He appears well-developed and well-nourished. No distress.   HENT:   Head: Normocephalic and atraumatic.   Nose: Nose normal.   Mouth/Throat: Oropharynx is clear and moist. No oropharyngeal exudate.   Eyes: Conjunctivae and EOM are normal. Pupils are equal, round, and reactive to light. No scleral icterus.   Neck: Normal range of motion. Neck supple.   Cardiovascular: Normal rate, regular rhythm and normal heart sounds. Exam reveals no gallop and no friction rub.   No murmur heard.  Pulmonary/Chest: Effort normal and breath sounds normal. No respiratory distress. He has no wheezes. He has no rales.   Abdominal: Soft. Bowel sounds are normal. He exhibits no distension. There is no tenderness. There is no rebound and no guarding.   Musculoskeletal: Normal range of motion. He exhibits no edema.   Lymphadenopathy:     He has no cervical adenopathy.   Neurological: He is alert and oriented to person, place, and time. No cranial nerve deficit.   Skin: Skin is warm and dry.   Psychiatric: He has a normal mood and affect. His behavior is normal. Judgment and thought content normal.   Nursing note and vitals reviewed.      Assessment/Plan   Jaden was seen " today for hypertension and hyperlipidemia.    Diagnoses and all orders for this visit:    Impaired glucose tolerance  -     Hemoglobin A1c; Future    Essential hypertension  -     CBC & Differential; Future  -     Comprehensive Metabolic Panel; Future  -     T4, Free; Future  -     TSH; Future  -     Urinalysis With Culture If Indicated -; Future    Mixed hyperlipidemia  -     LDL Cholesterol, Direct; Future  -     atorvastatin (LIPITOR) 80 MG tablet; Take 1 tablet by mouth Every Evening.    Seizures (CMS/HCC)    Major depression, recurrent, full remission (CMS/HCC)    Generalized anxiety disorder    Mixed anxiety and depressive disorder  -     FLUoxetine (PROzac) 40 MG capsule; Take 1 capsule by mouth Daily.    Gastroesophageal reflux disease without esophagitis  -     omeprazole (priLOSEC) 20 MG capsule; Take 1 capsule by mouth Daily.         Labs are reviewed with patient. Patient's glucose is slightly elevated at 124.  Patient will continue to watch diet to help maintain control of the glucose.  Patient understands that there is an increased risk of developing diabetes in the future.  His lipids are at goal, and he will continue with dietary management of the hyperlipidemia.  His blood pressure is controlled, so he will continue with his current blood pressure medication. He will continue with citalopram for treatment of the anxiety and depression.  He will also continue with lorazepam as needed.  He will continue to follow with urology secondary to the diagnosis prostate cancer.  He will follow-up with cardiology as scheduled secondary to the coronary artery disease and stent placement.    Patient understands the risks associated with this controlled medication, including tolerance and addiction.  Patient also agrees to only obtain this medication from me, and not from a another provider, unless that provider is covering for me in my absence.  Patient also agrees to be compliant in dosing, and not self adjust  the dose of medication.  A signed controlled substance agreement is on file, and the patient has received a controlled substance education sheet at this a previous visit.  The patient has also signed a consent for treatment with a controlled substance as per [facility] policy. ALONDRA was obtained.    CONTROLLED SUBSTANCE TRACKING 7/11/2019   Last Alondra 7/11/2019   Report Number [id number]   Last Controlled Substance Agreement 7/11/2019       PHQ-2/PHQ-9 Depression Screening 7/11/2019   Little interest or pleasure in doing things 0   Feeling down, depressed, or hopeless 0   Trouble falling or staying asleep, or sleeping too much -   Feeling tired or having little energy -   Poor appetite or overeating -   Feeling bad about yourself - or that you are a failure or have let yourself or your family down -   Trouble concentrating on things, such as reading the newspaper or watching television -   Moving or speaking so slowly that other people could have noticed. Or the opposite - being so fidgety or restless that you have been moving around a lot more than usual -   Thoughts that you would be better off dead, or of hurting yourself in some way -   Total Score 0   If you checked off any problems, how difficult have these problems made it for you to do your work, take care of things at home, or get along with other people? -         Lab on 07/05/2019   Component Date Value Ref Range Status   • Glucose 07/05/2019 124* 70 - 99 mg/dL Final   • BUN 07/05/2019 19  7 - 23 mg/dL Final   • Creatinine 07/05/2019 0.82  0.70 - 1.30 mg/dL Final   • Sodium 07/05/2019 144  137 - 145 mmol/L Final   • Potassium 07/05/2019 4.6  3.4 - 5.0 mmol/L Final   • Chloride 07/05/2019 107  101 - 112 mmol/L Final   • CO2 07/05/2019 27.0  22.0 - 30.0 mmol/L Final   • Calcium 07/05/2019 9.5  8.4 - 10.2 mg/dL Final   • Total Protein 07/05/2019 7.4  6.3 - 8.6 g/dL Final   • Albumin 07/05/2019 4.10  3.50 - 5.00 g/dL Final   • ALT (SGPT) 07/05/2019 22   <=50 U/L Final   • AST (SGOT) 07/05/2019 24  17 - 59 U/L Final   • Alkaline Phosphatase 07/05/2019 113  38 - 126 U/L Final   • Total Bilirubin 07/05/2019 1.4* 0.2 - 1.3 mg/dL Final   • eGFR Non African Amer 07/05/2019 93  49 - 113 mL/min/1.73 Final   • Globulin 07/05/2019 3.3  2.3 - 3.5 gm/dL Final   • A/G Ratio 07/05/2019 1.2  1.1 - 1.8 g/dL Final   • BUN/Creatinine Ratio 07/05/2019 23.2  7.0 - 25.0 Final   • Anion Gap 07/05/2019 10.0  5.0 - 15.0 mmol/L Final   • Free T4 07/05/2019 1.15  0.93 - 1.70 ng/dL Final   • TSH 07/05/2019 2.060  0.270 - 4.200 mIU/mL Final   • Color, UA 07/05/2019 Yellow  Yellow, Straw Final   • Appearance, UA 07/05/2019 Clear  Clear Final   • pH, UA 07/05/2019 6.5  5.5 - 8.0 Final   • Specific Gravity, UA 07/05/2019 1.025  1.005 - 1.030 Final   • Glucose, UA 07/05/2019 Negative  Negative Final   • Ketones, UA 07/05/2019 Negative  Negative Final   • Bilirubin, UA 07/05/2019 Negative  Negative Final   • Blood, UA 07/05/2019 Negative  Negative Final   • Protein, UA 07/05/2019 30 mg/dL (1+)* Negative Final   • Leuk Esterase, UA 07/05/2019 Negative  Negative Final   • Nitrite, UA 07/05/2019 Negative  Negative Final   • Urobilinogen, UA 07/05/2019 0.2 E.U./dL  0.2 - 1.0 E.U./dL Final   • Hemoglobin A1C 07/05/2019 6.70* 4.80 - 5.60 % Final   • Total Cholesterol 07/05/2019 79* 150 - 200 mg/dL Final   • Triglycerides 07/05/2019 69  <=150 mg/dL Final   • HDL Cholesterol 07/05/2019 33* 40 - 59 mg/dL Final   • LDL Cholesterol  07/05/2019 32  <=100 mg/dL Final   • VLDL Cholesterol 07/05/2019 13.8  mg/dL Final   • LDL/HDL Ratio 07/05/2019 0.98  0.00 - 3.55 Final   • WBC 07/05/2019 9.73  3.40 - 10.80 10*3/mm3 Final   • RBC 07/05/2019 5.57  4.14 - 5.80 10*6/mm3 Final   • Hemoglobin 07/05/2019 15.7  13.0 - 17.7 g/dL Final   • Hematocrit 07/05/2019 48.1  37.5 - 51.0 % Final   • MCV 07/05/2019 86.4  79.0 - 97.0 fL Final   • MCH 07/05/2019 28.2  26.6 - 33.0 pg Final   • MCHC 07/05/2019 32.6  31.5 - 35.7 g/dL  Final   • RDW 07/05/2019 14.8  12.3 - 15.4 % Final   • RDW-SD 07/05/2019 46.6  37.0 - 54.0 fl Final   • MPV 07/05/2019 11.5  6.0 - 12.0 fL Final   • Platelets 07/05/2019 212  140 - 450 10*3/mm3 Final   • Neutrophil % 07/05/2019 72.8  42.7 - 76.0 % Final   • Lymphocyte % 07/05/2019 17.6* 19.6 - 45.3 % Final   • Monocyte % 07/05/2019 5.9  5.0 - 12.0 % Final   • Eosinophil % 07/05/2019 3.5  0.3 - 6.2 % Final   • Basophil % 07/05/2019 0.2  0.0 - 1.5 % Final   • Neutrophils, Absolute 07/05/2019 7.09* 1.70 - 7.00 10*3/mm3 Final   • Lymphocytes, Absolute 07/05/2019 1.71  0.70 - 3.10 10*3/mm3 Final   • Monocytes, Absolute 07/05/2019 0.57  0.10 - 0.90 10*3/mm3 Final   • Eosinophils, Absolute 07/05/2019 0.34  0.00 - 0.40 10*3/mm3 Final   • Basophils, Absolute 07/05/2019 0.02  0.00 - 0.20 10*3/mm3 Final   • RBC, UA 07/05/2019 0-2* None Seen /HPF Final   • WBC, UA 07/05/2019 0-2* None Seen /HPF Final   • Bacteria,  07/05/2019 Trace* None Seen /HPF Final   • Squamous Epithelial Cells,  07/05/2019 0-2  None Seen, 0-2 /HPF Final   • Hyaline Casts,  07/05/2019 None Seen  None Seen /LPF Final   • Mucus,  07/05/2019 Small/1+* None Seen, Trace /HPF Final   • Methodology 07/05/2019 Manual Light Microscopy   Final   ].

## 2019-07-11 NOTE — PATIENT INSTRUCTIONS

## 2019-09-25 DIAGNOSIS — R56.9 SEIZURES (HCC): Chronic | ICD-10-CM

## 2019-09-26 RX ORDER — LEVETIRACETAM 500 MG/1
TABLET ORAL
Qty: 180 TABLET | Refills: 1 | Status: SHIPPED | OUTPATIENT
Start: 2019-09-26 | End: 2020-04-09

## 2020-01-06 ENCOUNTER — LAB (OUTPATIENT)
Dept: LAB | Facility: OTHER | Age: 70
End: 2020-01-06

## 2020-01-06 DIAGNOSIS — I10 ESSENTIAL HYPERTENSION: ICD-10-CM

## 2020-01-06 DIAGNOSIS — R73.02 IMPAIRED GLUCOSE TOLERANCE: Chronic | ICD-10-CM

## 2020-01-06 DIAGNOSIS — E78.2 MIXED HYPERLIPIDEMIA: Chronic | ICD-10-CM

## 2020-01-06 LAB
ALBUMIN SERPL-MCNC: 4.3 G/DL (ref 3.5–5)
ALBUMIN/GLOB SERPL: 1.3 G/DL (ref 1.1–1.8)
ALP SERPL-CCNC: 105 U/L (ref 38–126)
ALT SERPL W P-5'-P-CCNC: 25 U/L
ANION GAP SERPL CALCULATED.3IONS-SCNC: 6 MMOL/L (ref 5–15)
ARTICHOKE IGE QN: 40 MG/DL (ref 0–100)
AST SERPL-CCNC: 27 U/L (ref 17–59)
BILIRUB SERPL-MCNC: 1.9 MG/DL (ref 0.2–1.3)
BILIRUB UR QL STRIP: NEGATIVE
BUN BLD-MCNC: 21 MG/DL (ref 7–23)
BUN/CREAT SERPL: 23.9 (ref 7–25)
CALCIUM SPEC-SCNC: 9.3 MG/DL (ref 8.4–10.2)
CHLORIDE SERPL-SCNC: 105 MMOL/L (ref 101–112)
CLARITY UR: CLEAR
CO2 SERPL-SCNC: 28 MMOL/L (ref 22–30)
COLOR UR: YELLOW
CREAT BLD-MCNC: 0.88 MG/DL (ref 0.7–1.3)
DEPRECATED RDW RBC AUTO: 47 FL (ref 37–54)
EOSINOPHIL # BLD MANUAL: 0.24 10*3/MM3 (ref 0–0.4)
EOSINOPHIL NFR BLD MANUAL: 3 % (ref 0.3–6.2)
ERYTHROCYTE [DISTWIDTH] IN BLOOD BY AUTOMATED COUNT: 14.9 % (ref 12.3–15.4)
GFR SERPL CREATININE-BSD FRML MDRD: 86 ML/MIN/1.73 (ref 49–113)
GLOBULIN UR ELPH-MCNC: 3.3 GM/DL (ref 2.3–3.5)
GLUCOSE BLD-MCNC: 120 MG/DL (ref 70–99)
GLUCOSE UR STRIP-MCNC: NEGATIVE MG/DL
HBA1C MFR BLD: 6.8 % (ref 4.8–5.6)
HCT VFR BLD AUTO: 49.9 % (ref 37.5–51)
HGB BLD-MCNC: 16.2 G/DL (ref 13–17.7)
HGB UR QL STRIP.AUTO: NEGATIVE
KETONES UR QL STRIP: NEGATIVE
LEUKOCYTE ESTERASE UR QL STRIP.AUTO: NEGATIVE
LYMPHOCYTES # BLD MANUAL: 1.84 10*3/MM3 (ref 0.7–3.1)
LYMPHOCYTES NFR BLD MANUAL: 23 % (ref 19.6–45.3)
LYMPHOCYTES NFR BLD MANUAL: 6 % (ref 5–12)
MCH RBC QN AUTO: 27.8 PG (ref 26.6–33)
MCHC RBC AUTO-ENTMCNC: 32.5 G/DL (ref 31.5–35.7)
MCV RBC AUTO: 85.7 FL (ref 79–97)
MONOCYTES # BLD AUTO: 0.48 10*3/MM3 (ref 0.1–0.9)
NEUTROPHILS # BLD AUTO: 5.44 10*3/MM3 (ref 1.7–7)
NEUTROPHILS NFR BLD MANUAL: 68 % (ref 42.7–76)
NITRITE UR QL STRIP: NEGATIVE
PH UR STRIP.AUTO: 5.5 [PH] (ref 5.5–8)
PLATELET # BLD AUTO: 206 10*3/MM3 (ref 140–450)
PMV BLD AUTO: 11.5 FL (ref 6–12)
POTASSIUM BLD-SCNC: 4.3 MMOL/L (ref 3.4–5)
PROT SERPL-MCNC: 7.6 G/DL (ref 6.3–8.6)
PROT UR QL STRIP: ABNORMAL
RBC # BLD AUTO: 5.82 10*6/MM3 (ref 4.14–5.8)
RBC MORPH BLD: NORMAL
SMALL PLATELETS BLD QL SMEAR: ADEQUATE
SODIUM BLD-SCNC: 139 MMOL/L (ref 137–145)
SP GR UR STRIP: 1.02 (ref 1–1.03)
T4 FREE SERPL-MCNC: 1.28 NG/DL (ref 0.93–1.7)
TSH SERPL DL<=0.05 MIU/L-ACNC: 1.9 UIU/ML (ref 0.27–4.2)
UROBILINOGEN UR QL STRIP: ABNORMAL
WBC MORPH BLD: NORMAL
WBC NRBC COR # BLD: 8 10*3/MM3 (ref 3.4–10.8)

## 2020-01-06 PROCEDURE — 83036 HEMOGLOBIN GLYCOSYLATED A1C: CPT | Performed by: INTERNAL MEDICINE

## 2020-01-06 PROCEDURE — 80053 COMPREHEN METABOLIC PANEL: CPT | Performed by: INTERNAL MEDICINE

## 2020-01-06 PROCEDURE — 81003 URINALYSIS AUTO W/O SCOPE: CPT | Performed by: INTERNAL MEDICINE

## 2020-01-06 PROCEDURE — 85025 COMPLETE CBC W/AUTO DIFF WBC: CPT | Performed by: INTERNAL MEDICINE

## 2020-01-06 PROCEDURE — 84443 ASSAY THYROID STIM HORMONE: CPT | Performed by: INTERNAL MEDICINE

## 2020-01-06 PROCEDURE — 83721 ASSAY OF BLOOD LIPOPROTEIN: CPT | Performed by: INTERNAL MEDICINE

## 2020-01-06 PROCEDURE — 36415 COLL VENOUS BLD VENIPUNCTURE: CPT | Performed by: INTERNAL MEDICINE

## 2020-01-06 PROCEDURE — 84439 ASSAY OF FREE THYROXINE: CPT | Performed by: INTERNAL MEDICINE

## 2020-01-12 NOTE — PROGRESS NOTES
Chief Complaint   Patient presents with   • Medicare Wellness-subsequent   • Hypertension     6 mo f/u with labs   • Hyperlipidemia     Subjective   Jaden Faith is a 69 y.o. male who presents to the office for follow-up and review of labs. He has impaired glucose tolerance and has been monitoring his dietary intake of sugars and carbohydrates.  He has hypertension and his blood pressure has been controlled.  He has hyperlipidemia and takes Lipitor daily.  He has GERD and takes omeprazole daily.  He takes fluoxetine for his anxiety and depression.  He has panic attacks and keeps lorazepam on hand to use as needed.  He has not had to take a dose in several years.  His depression is in full remission.  He has seizure disorder and takes Keppra.  He has been seizure-free, and follows with neurology.  He was diagnosed with prostate cancer in 2016.  He had a prostatectomy in 2017.  He continues to follow with urology, who manages his PSA levels.      He has mild peripheral vascular disease.  He also has coronary artery disease, with a history of stent placement.  He continues to follow with cardiology.  His metoprolol has recently been reduced to 25 mg daily.     The following portions of the patient's history were reviewed and updated as appropriate: allergies, current medications, past family history, past medical history, past social history, past surgical history and problem list.    Review of Systems   Constitutional: Negative for chills, fatigue and fever.   HENT: Negative for congestion, sneezing, sore throat and trouble swallowing.    Eyes: Negative for visual disturbance.   Respiratory: Negative for cough, chest tightness, shortness of breath and wheezing.    Cardiovascular: Negative for chest pain, palpitations and leg swelling.   Gastrointestinal: Negative for abdominal pain, constipation, diarrhea, nausea and vomiting.   Genitourinary: Negative for dysuria, frequency and urgency.   Musculoskeletal:  "Negative for neck pain.   Neurological: Negative for dizziness, weakness and headaches.   Psychiatric/Behavioral:        Patient denies any feelings of depression and has not felt down, hopeless or lost interest in any activities.   All other systems reviewed and are negative.      Objective   Vitals:    01/13/20 0921   BP: 116/68   BP Location: Left arm   Patient Position: Sitting   Cuff Size: Adult   Pulse: 64   Temp: 97.6 °F (36.4 °C)   TempSrc: Oral   Weight: 97.1 kg (214 lb)   Height: 170.2 cm (67\")   PainSc: 0-No pain      Body mass index is 33.52 kg/m².    Physical Exam   Constitutional: He is oriented to person, place, and time. He appears well-developed and well-nourished. No distress.   HENT:   Head: Normocephalic and atraumatic.   Nose: Nose normal.   Mouth/Throat: Oropharynx is clear and moist. No oropharyngeal exudate.   Eyes: Pupils are equal, round, and reactive to light. Conjunctivae and EOM are normal. No scleral icterus.   Neck: Normal range of motion. Neck supple.   Cardiovascular: Normal rate, regular rhythm and normal heart sounds. Exam reveals no gallop and no friction rub.   No murmur heard.  Pulmonary/Chest: Effort normal and breath sounds normal. No respiratory distress. He has no wheezes. He has no rales.   Abdominal: Soft. Bowel sounds are normal. He exhibits no distension. There is no tenderness. There is no rebound and no guarding.   Musculoskeletal: Normal range of motion. He exhibits no edema.   Lymphadenopathy:     He has no cervical adenopathy.   Neurological: He is alert and oriented to person, place, and time. No cranial nerve deficit.   Skin: Skin is warm and dry.   Psychiatric: He has a normal mood and affect. His behavior is normal. Judgment and thought content normal.   Nursing note and vitals reviewed.      Assessment/Plan   Jaden was seen today for medicare wellness-subsequent, hypertension and hyperlipidemia.    Diagnoses and all orders for this visit:    Medicare annual " wellness visit, subsequent    Impaired glucose tolerance  -     Hemoglobin A1c; Future    Essential hypertension  -     CBC & Differential; Future  -     Comprehensive Metabolic Panel; Future  -     T4, Free; Future  -     TSH; Future  -     Urinalysis With Culture If Indicated -; Future  -     metoprolol succinate XL (TOPROL-XL) 25 MG 24 hr tablet; Take 1 tablet by mouth Daily.    Mixed hyperlipidemia  -     Lipid Panel; Future  -     atorvastatin (LIPITOR) 80 MG tablet; Take 1 tablet by mouth Every Evening.    Peripheral vascular disease (CMS/HCC)    Seizures (CMS/HCC)    Prostate cancer (CMS/HCC)    Major depression, recurrent, full remission (CMS/HCC)  -     FLUoxetine (PROzac) 40 MG capsule; Take 1 capsule by mouth Daily.    Generalized anxiety disorder  -     FLUoxetine (PROzac) 40 MG capsule; Take 1 capsule by mouth Daily.    Gastroesophageal reflux disease without esophagitis  -     omeprazole (priLOSEC) 20 MG capsule; Take 1 capsule by mouth Daily.         Labs are reviewed with patient. Patient's glucose is slightly elevated at 120.  His hemoglobin A1c is 6.8.  Patient will continue to watch diet to help maintain control of the glucose.  Patient understands that there is an increased risk of developing diabetes in the future.  His LDL is at goal at 40.  He will continue with Lipitor for treatment of the hyperlipidemia.  His blood pressure is controlled, so he will continue with his current blood pressure medication including the lower 25 mg daily dose of Toprol-XL. He will continue with fluoxetine for treatment of the anxiety and depression.  He will also continue with lorazepam as needed.  He will continue with omeprazole for treatment of GERD.  He will continue to follow with urology secondary to the diagnosis prostate cancer.  He will follow-up with cardiology as scheduled secondary to the coronary artery disease and peripheral vascular disease.  He will continue with Keppra for treatment of the seizure  disorder, and follow-up with neurology as scheduled.    Patient understands the risks associated with this controlled medication, including tolerance and addiction.  Patient also agrees to only obtain this medication from me, and not from a another provider, unless that provider is covering for me in my absence.  Patient also agrees to be compliant in dosing, and not self adjust the dose of medication.  A signed controlled substance agreement is on file, and the patient has received a controlled substance education sheet at this a previous visit.  The patient has also signed a consent for treatment with a controlled substance as per [facility] policy. ALONDRA was obtained.    Patient's Body mass index is 33.52 kg/m². BMI is above normal parameters. Recommendations include: educational material.    CONTROLLED SUBSTANCE TRACKING 7/11/2019 1/13/2020   Last Alondra 7/11/2019 1/13/2020   Report Number [id number]   Last Controlled Substance Agreement 7/11/2019 1/13/2020       PHQ-2/PHQ-9 Depression Screening 1/13/2020   Little interest or pleasure in doing things 0   Feeling down, depressed, or hopeless 0   Trouble falling or staying asleep, or sleeping too much -   Feeling tired or having little energy -   Poor appetite or overeating -   Feeling bad about yourself - or that you are a failure or have let yourself or your family down -   Trouble concentrating on things, such as reading the newspaper or watching television -   Moving or speaking so slowly that other people could have noticed. Or the opposite - being so fidgety or restless that you have been moving around a lot more than usual -   Thoughts that you would be better off dead, or of hurting yourself in some way -   Total Score 0   If you checked off any problems, how difficult have these problems made it for you to do your work, take care of things at home, or get along with other people? -         Lab on 01/06/2020   Component Date Value Ref Range  Status   • Glucose 01/06/2020 120* 70 - 99 mg/dL Final   • BUN 01/06/2020 21  7 - 23 mg/dL Final   • Creatinine 01/06/2020 0.88  0.70 - 1.30 mg/dL Final   • Sodium 01/06/2020 139  137 - 145 mmol/L Final   • Potassium 01/06/2020 4.3  3.4 - 5.0 mmol/L Final   • Chloride 01/06/2020 105  101 - 112 mmol/L Final   • CO2 01/06/2020 28.0  22.0 - 30.0 mmol/L Final   • Calcium 01/06/2020 9.3  8.4 - 10.2 mg/dL Final   • Total Protein 01/06/2020 7.6  6.3 - 8.6 g/dL Final   • Albumin 01/06/2020 4.30  3.50 - 5.00 g/dL Final   • ALT (SGPT) 01/06/2020 25  <=50 U/L Final   • AST (SGOT) 01/06/2020 27  17 - 59 U/L Final   • Alkaline Phosphatase 01/06/2020 105  38 - 126 U/L Final   • Total Bilirubin 01/06/2020 1.9* 0.2 - 1.3 mg/dL Final   • eGFR Non African Amer 01/06/2020 86  49 - 113 mL/min/1.73 Final   • Globulin 01/06/2020 3.3  2.3 - 3.5 gm/dL Final   • A/G Ratio 01/06/2020 1.3  1.1 - 1.8 g/dL Final   • BUN/Creatinine Ratio 01/06/2020 23.9  7.0 - 25.0 Final   • Anion Gap 01/06/2020 6.0  5.0 - 15.0 mmol/L Final   • Free T4 01/06/2020 1.28  0.93 - 1.70 ng/dL Final   • TSH 01/06/2020 1.900  0.270 - 4.200 uIU/mL Final   • Color, UA 01/06/2020 Yellow  Yellow, Straw Final   • Appearance, UA 01/06/2020 Clear  Clear Final   • pH, UA 01/06/2020 5.5  5.5 - 8.0 Final   • Specific Gravity, UA 01/06/2020 1.025  1.005 - 1.030 Final   • Glucose, UA 01/06/2020 Negative  Negative Final   • Ketones, UA 01/06/2020 Negative  Negative Final   • Bilirubin, UA 01/06/2020 Negative  Negative Final   • Blood, UA 01/06/2020 Negative  Negative Final   • Protein, UA 01/06/2020 Trace* Negative Final   • Leuk Esterase, UA 01/06/2020 Negative  Negative Final   • Nitrite, UA 01/06/2020 Negative  Negative Final   • Urobilinogen, UA 01/06/2020 0.2 E.U./dL  0.2 - 1.0 E.U./dL Final   • Hemoglobin A1C 01/06/2020 6.80* 4.80 - 5.60 % Final   • LDL Cholesterol  01/06/2020 40  0 - 100 mg/dL Final   • WBC 01/06/2020 8.00  3.40 - 10.80 10*3/mm3 Final   • RBC 01/06/2020 5.82*  4.14 - 5.80 10*6/mm3 Final   • Hemoglobin 01/06/2020 16.2  13.0 - 17.7 g/dL Final   • Hematocrit 01/06/2020 49.9  37.5 - 51.0 % Final   • MCV 01/06/2020 85.7  79.0 - 97.0 fL Final   • MCH 01/06/2020 27.8  26.6 - 33.0 pg Final   • MCHC 01/06/2020 32.5  31.5 - 35.7 g/dL Final   • RDW 01/06/2020 14.9  12.3 - 15.4 % Final   • RDW-SD 01/06/2020 47.0  37.0 - 54.0 fl Final   • MPV 01/06/2020 11.5  6.0 - 12.0 fL Final   • Platelets 01/06/2020 206  140 - 450 10*3/mm3 Final   • Neutrophil % 01/06/2020 68.0  42.7 - 76.0 % Final   • Lymphocyte % 01/06/2020 23.0  19.6 - 45.3 % Final   • Monocyte % 01/06/2020 6.0  5.0 - 12.0 % Final   • Eosinophil % 01/06/2020 3.0  0.3 - 6.2 % Final   • Neutrophils Absolute 01/06/2020 5.44  1.70 - 7.00 10*3/mm3 Final   • Lymphocytes Absolute 01/06/2020 1.84  0.70 - 3.10 10*3/mm3 Final   • Monocytes Absolute 01/06/2020 0.48  0.10 - 0.90 10*3/mm3 Final   • Eosinophils Absolute 01/06/2020 0.24  0.00 - 0.40 10*3/mm3 Final   • RBC Morphology 01/06/2020 Normal  Normal Final   • WBC Morphology 01/06/2020 Normal  Normal Final   • Platelet Estimate 01/06/2020 Adequate  Normal Final   ].

## 2020-01-13 ENCOUNTER — OFFICE VISIT (OUTPATIENT)
Dept: FAMILY MEDICINE CLINIC | Facility: CLINIC | Age: 70
End: 2020-01-13

## 2020-01-13 VITALS
BODY MASS INDEX: 33.59 KG/M2 | SYSTOLIC BLOOD PRESSURE: 116 MMHG | HEIGHT: 67 IN | HEART RATE: 64 BPM | WEIGHT: 214 LBS | TEMPERATURE: 97.6 F | DIASTOLIC BLOOD PRESSURE: 68 MMHG

## 2020-01-13 DIAGNOSIS — E78.2 MIXED HYPERLIPIDEMIA: Chronic | ICD-10-CM

## 2020-01-13 DIAGNOSIS — Z00.00 MEDICARE ANNUAL WELLNESS VISIT, SUBSEQUENT: Primary | ICD-10-CM

## 2020-01-13 DIAGNOSIS — C61 PROSTATE CANCER (HCC): Chronic | ICD-10-CM

## 2020-01-13 DIAGNOSIS — F33.42 MAJOR DEPRESSION, RECURRENT, FULL REMISSION (HCC): Chronic | ICD-10-CM

## 2020-01-13 DIAGNOSIS — F41.1 GENERALIZED ANXIETY DISORDER: Chronic | ICD-10-CM

## 2020-01-13 DIAGNOSIS — R56.9 SEIZURES (HCC): Chronic | ICD-10-CM

## 2020-01-13 DIAGNOSIS — I10 ESSENTIAL HYPERTENSION: Chronic | ICD-10-CM

## 2020-01-13 DIAGNOSIS — R73.02 IMPAIRED GLUCOSE TOLERANCE: Chronic | ICD-10-CM

## 2020-01-13 DIAGNOSIS — I73.9 PERIPHERAL VASCULAR DISEASE (HCC): Chronic | ICD-10-CM

## 2020-01-13 DIAGNOSIS — K21.9 GASTROESOPHAGEAL REFLUX DISEASE WITHOUT ESOPHAGITIS: Chronic | ICD-10-CM

## 2020-01-13 PROCEDURE — G0439 PPPS, SUBSEQ VISIT: HCPCS | Performed by: INTERNAL MEDICINE

## 2020-01-13 RX ORDER — OMEPRAZOLE 20 MG/1
20 CAPSULE, DELAYED RELEASE ORAL DAILY
Qty: 90 CAPSULE | Refills: 1 | Status: SHIPPED | OUTPATIENT
Start: 2020-01-13 | End: 2020-07-13 | Stop reason: SDUPTHER

## 2020-01-13 RX ORDER — FLUOXETINE HYDROCHLORIDE 40 MG/1
40 CAPSULE ORAL DAILY
Qty: 90 CAPSULE | Refills: 1 | Status: SHIPPED | OUTPATIENT
Start: 2020-01-13 | End: 2020-07-13 | Stop reason: SDUPTHER

## 2020-01-13 RX ORDER — ATORVASTATIN CALCIUM 80 MG/1
80 TABLET, FILM COATED ORAL EVERY EVENING
Qty: 90 TABLET | Refills: 1 | Status: SHIPPED | OUTPATIENT
Start: 2020-01-13 | End: 2020-07-13 | Stop reason: SDUPTHER

## 2020-01-13 RX ORDER — METOPROLOL SUCCINATE 25 MG/1
25 TABLET, EXTENDED RELEASE ORAL DAILY
Qty: 90 TABLET | Refills: 1 | Status: SHIPPED | OUTPATIENT
Start: 2020-01-13 | End: 2020-07-13 | Stop reason: SDUPTHER

## 2020-01-13 NOTE — PROGRESS NOTES
QUICK REFERENCE INFORMATION:  The ABCs of the Annual Wellness Visit    Subsequent Medicare Wellness Visit    HEALTH RISK ASSESSMENT    1950    Recent Hospitalizations:  No hospitalization(s) within the last year..        Current Medical Providers:  Patient Care Team:  Ruiz Li MD as PCP - General  Garth Hawley MD as Consulting Physician (Neurology)  Gordon Lorenzo MD as Consulting Physician (Urology)  Ed Colunga MD as Surgeon (Otolaryngology)  Bharath Aguirre as Cardiologist (Cardiology)        Smoking Status:  Social History     Tobacco Use   Smoking Status Never Smoker   Smokeless Tobacco Never Used       Alcohol Consumption:  Social History     Substance and Sexual Activity   Alcohol Use No       Depression Screen:   PHQ-2/PHQ-9 Depression Screening 1/13/2020   Little interest or pleasure in doing things 0   Feeling down, depressed, or hopeless 0   Trouble falling or staying asleep, or sleeping too much -   Feeling tired or having little energy -   Poor appetite or overeating -   Feeling bad about yourself - or that you are a failure or have let yourself or your family down -   Trouble concentrating on things, such as reading the newspaper or watching television -   Moving or speaking so slowly that other people could have noticed. Or the opposite - being so fidgety or restless that you have been moving around a lot more than usual -   Thoughts that you would be better off dead, or of hurting yourself in some way -   Total Score 0   If you checked off any problems, how difficult have these problems made it for you to do your work, take care of things at home, or get along with other people? -       Health Habits and Functional and Cognitive Screening:  Functional & Cognitive Status 1/7/2019   Do you have difficulty preparing food and eating? No   Do you have difficulty bathing yourself, getting dressed or grooming yourself? No   Do you have difficulty using the toilet? No    Do you have difficulty moving around from place to place? No   Do you have trouble with steps or getting out of a bed or a chair? No   Current Diet Well Balanced Diet   Dental Exam Up to date   Eye Exam Not up to date   Exercise (times per week) 3 times per week   Current Exercise Activities Include Walking   Do you need help using the phone?  No   Are you deaf or do you have serious difficulty hearing?  No   Do you need help with transportation? No   Do you need help shopping? No   Do you need help preparing meals?  No   Do you need help with housework?  No   Do you need help with laundry? No   Do you need help taking your medications? No   Do you need help managing money? No   Do you ever drive or ride in a car without wearing a seat belt? No   Have you felt unusual stress, anger or loneliness in the last month? -   Who do you live with? -   If you need help, do you have trouble finding someone available to you? -   Have you been bothered in the last four weeks by sexual problems? -   Do you have difficulty concentrating, remembering or making decisions? -           Does the patient have evidence of cognitive impairment? No    Aspirin use counseling: Taking ASA appropriately as indicated      Recent Lab Results:  CMP:  Lab Results   Component Value Date     (H) 04/08/2019    BUN 21 01/06/2020    CREATININE 0.88 01/06/2020    EGFRIFNONA 86 01/06/2020    EGFRIFAFRI 102 04/08/2019    BCR 23.9 01/06/2020     01/06/2020    K 4.3 01/06/2020    CO2 28.0 01/06/2020    CALCIUM 9.3 01/06/2020    ALBUMIN 4.30 01/06/2020    BILITOT 1.9 (H) 01/06/2020    ALKPHOS 105 01/06/2020    AST 27 01/06/2020    ALT 25 01/06/2020     Lipid Panel:  Lab Results   Component Value Date    CHOL 79 (L) 07/05/2019    TRIG 69 07/05/2019    HDL 33 (L) 07/05/2019    VLDL 13.8 07/05/2019    LDLHDL 0.98 07/05/2019     HbA1c:  Lab Results   Component Value Date    HGBA1C 6.80 (H) 01/06/2020       Visual Acuity:  No exam data  present    Age-appropriate Screening Schedule:  Refer to the list below for future screening recommendations based on patient's age, sex and/or medical conditions. Orders for these recommended tests are listed in the plan section. The patient has been provided with a written plan.    Health Maintenance   Topic Date Due   • ZOSTER VACCINE (1 of 2) 02/13/2001   • PROSTATE CANCER SCREENING  02/19/2020   • LIPID PANEL  01/06/2021   • COLONOSCOPY  06/06/2026   • TDAP/TD VACCINES (2 - Td) 11/17/2026   • INFLUENZA VACCINE  Completed        Subjective   History of Present Illness    Jaden Faith is a 69 y.o. male who presents for an Subsequent Wellness Visit.    The following portions of the patient's history were reviewed and updated as appropriate: allergies, current medications, past family history, past medical history, past social history, past surgical history and problem list.    Outpatient Medications Prior to Visit   Medication Sig Dispense Refill   • aspirin 81 MG EC tablet Take 1 tablet by mouth Daily. 30 tablet 0   • atorvastatin (LIPITOR) 80 MG tablet Take 1 tablet by mouth Every Evening. 90 tablet 1   • FLUoxetine (PROzac) 40 MG capsule Take 1 capsule by mouth Daily. 90 capsule 1   • levETIRAcetam (KEPPRA) 500 MG tablet TAKE 1 TABLET BY MOUTH TWICE DAILY 180 tablet 1   • LORazepam (ATIVAN) 0.5 MG tablet Take 1 tablet by mouth Every 8 (Eight) Hours As Needed for Anxiety or Seizures (panic attacks). 20 tablet 0   • losartan (COZAAR) 25 MG tablet Take 25 mg by mouth Daily.     • metoprolol succinate XL (TOPROL-XL) 50 MG 24 hr tablet Take 1 tablet by mouth Daily. (Patient taking differently: Take 25 mg by mouth Daily.) 90 tablet 1   • Multiple Vitamins-Minerals (MULTIVITAMIN ADULT PO) Take 1 tablet by mouth Daily.     • nitroglycerin (NITROSTAT) 0.4 MG SL tablet Place 0.4 mg under the tongue.     • omeprazole (priLOSEC) 20 MG capsule Take 1 capsule by mouth Daily. 90 capsule 1   • prasugrel (EFFIENT) 10  "MG tablet Take 10 mg by mouth Daily.     • triamcinolone (KENALOG) 0.1 % cream Apply  topically to the appropriate area as directed 2 (Two) Times a Day. 60 g 0     No facility-administered medications prior to visit.        Patient Active Problem List   Diagnosis   • Mixed hyperlipidemia   • Gastroesophageal reflux disease without esophagitis   • Non-seasonal allergic rhinitis due to pollen   • Essential hypertension   • Peripheral vascular disease (CMS/HCC)   • Impaired glucose tolerance   • Major depression, recurrent, full remission (CMS/HCC)   • Seizures (CMS/HCC)   • Prostate cancer (CMS/HCC)   • Panic attacks   • Generalized anxiety disorder   • Coronary artery disease involving native coronary artery of native heart without angina pectoris       Advance Care Planning:  has NO advance directive - not interested in additional information  ACP discussion was held with the patient during this visit. Patient does not have an advance directive, declines further assistance.  Identification of Risk Factors:  Risk factors include: weight , unhealthy diet and depression.    Review of Systems    Compared to one year ago, the patient feels his physical health is the same.  Compared to one year ago, the patient feels his mental health is the same.    Objective     Physical Exam    Vitals:    01/13/20 0921   BP: 116/68   BP Location: Left arm   Patient Position: Sitting   Cuff Size: Adult   Pulse: 64   Temp: 97.6 °F (36.4 °C)   TempSrc: Oral   Weight: 97.1 kg (214 lb)   Height: 170.2 cm (67\")   PainSc: 0-No pain       Patient's Body mass index is 33.52 kg/m². BMI is above normal parameters. Recommendations include: educational material.      Assessment/Plan   Patient Self-Management and Personalized Health Advice  The patient has been provided with information about: diet, exercise and weight management and preventive services including:   · Diabetes screening, see lab orders, Exercise counseling provided, Fall Risk " assessment done, Nutrition counseling provided.    Visit Diagnoses:    ICD-10-CM ICD-9-CM   1. Medicare annual wellness visit, subsequent Z00.00 V70.0   2. Impaired glucose tolerance R73.02 790.22   3. Essential hypertension I10 401.9   4. Mixed hyperlipidemia E78.2 272.2   5. Peripheral vascular disease (CMS/HCC) I73.9 443.9   6. Seizures (CMS/HCC) R56.9 780.39   7. Prostate cancer (CMS/HCC) C61 185   8. Major depression, recurrent, full remission (CMS/HCC) F33.42 296.36   9. Generalized anxiety disorder F41.1 300.02   10. Gastroesophageal reflux disease without esophagitis K21.9 530.81       Orders Placed This Encounter   Procedures   • Comprehensive Metabolic Panel     Standing Status:   Future     Standing Expiration Date:   1/12/2021   • T4, Free     Standing Status:   Future     Standing Expiration Date:   1/12/2021   • TSH     Standing Status:   Future     Standing Expiration Date:   1/12/2021   • Urinalysis With Culture If Indicated -     Standing Status:   Future     Standing Expiration Date:   1/12/2021   • Hemoglobin A1c     Standing Status:   Future     Standing Expiration Date:   1/12/2021   • Lipid Panel     Standing Status:   Future     Standing Expiration Date:   1/12/2021   • CBC & Differential     Standing Status:   Future     Standing Expiration Date:   1/12/2021     Order Specific Question:   Manual Differential     Answer:   No       Outpatient Encounter Medications as of 1/13/2020   Medication Sig Dispense Refill   • aspirin 81 MG EC tablet Take 1 tablet by mouth Daily. 30 tablet 0   • atorvastatin (LIPITOR) 80 MG tablet Take 1 tablet by mouth Every Evening. 90 tablet 1   • FLUoxetine (PROzac) 40 MG capsule Take 1 capsule by mouth Daily. 90 capsule 1   • levETIRAcetam (KEPPRA) 500 MG tablet TAKE 1 TABLET BY MOUTH TWICE DAILY 180 tablet 1   • LORazepam (ATIVAN) 0.5 MG tablet Take 1 tablet by mouth Every 8 (Eight) Hours As Needed for Anxiety or Seizures (panic attacks). 20 tablet 0   • losartan  (COZAAR) 25 MG tablet Take 25 mg by mouth Daily.     • metoprolol succinate XL (TOPROL-XL) 50 MG 24 hr tablet Take 1 tablet by mouth Daily. (Patient taking differently: Take 25 mg by mouth Daily.) 90 tablet 1   • Multiple Vitamins-Minerals (MULTIVITAMIN ADULT PO) Take 1 tablet by mouth Daily.     • nitroglycerin (NITROSTAT) 0.4 MG SL tablet Place 0.4 mg under the tongue.     • omeprazole (priLOSEC) 20 MG capsule Take 1 capsule by mouth Daily. 90 capsule 1   • prasugrel (EFFIENT) 10 MG tablet Take 10 mg by mouth Daily.     • triamcinolone (KENALOG) 0.1 % cream Apply  topically to the appropriate area as directed 2 (Two) Times a Day. 60 g 0     No facility-administered encounter medications on file as of 1/13/2020.        Reviewed use of high risk medication in the elderly: yes  Reviewed for potential of harmful drug interactions in the elderly: yes    Follow Up:  Return in about 6 months (around 7/13/2020) for Next scheduled follow up, Or sooner as needed With Labs prior to appointment.     An After Visit Summary and PPPS with all of these plans were given to the patient.       .

## 2020-01-13 NOTE — PATIENT INSTRUCTIONS

## 2020-02-13 RX ORDER — LOSARTAN POTASSIUM 25 MG/1
25 TABLET ORAL DAILY
Qty: 30 TABLET | Refills: 3 | Status: SHIPPED | OUTPATIENT
Start: 2020-02-13 | End: 2020-05-18 | Stop reason: SDUPTHER

## 2020-02-13 NOTE — TELEPHONE ENCOUNTER
Dr. Sinha prescribes this, but pharmacy has been unable to get ahold of this prescriber and the patient has gone through the emergency 3 day supply that the pharmacy can give.  Please advise if okay to send a refill.

## 2020-04-09 DIAGNOSIS — R56.9 SEIZURES (HCC): Chronic | ICD-10-CM

## 2020-04-09 RX ORDER — LEVETIRACETAM 500 MG/1
TABLET ORAL
Qty: 180 TABLET | Refills: 0 | Status: SHIPPED | OUTPATIENT
Start: 2020-04-09 | End: 2020-07-10 | Stop reason: SDUPTHER

## 2020-04-09 NOTE — TELEPHONE ENCOUNTER
----- Message from Rivka Sandoval sent at 4/9/2020 10:36 AM CDT -----  Regarding: MED REFILL  Contact: [phone]   Needs refill on levETIRAcetam (KEPPRA) 500 MG, to [facility].

## 2020-05-18 RX ORDER — LOSARTAN POTASSIUM 25 MG/1
25 TABLET ORAL DAILY
Qty: 90 TABLET | Refills: 0 | Status: SHIPPED | OUTPATIENT
Start: 2020-05-18 | End: 2020-07-13 | Stop reason: SDUPTHER

## 2020-07-06 ENCOUNTER — LAB (OUTPATIENT)
Dept: LAB | Facility: OTHER | Age: 70
End: 2020-07-06

## 2020-07-06 DIAGNOSIS — I10 ESSENTIAL HYPERTENSION: ICD-10-CM

## 2020-07-06 DIAGNOSIS — R73.02 IMPAIRED GLUCOSE TOLERANCE: Chronic | ICD-10-CM

## 2020-07-06 DIAGNOSIS — E78.2 MIXED HYPERLIPIDEMIA: Chronic | ICD-10-CM

## 2020-07-06 LAB
ALBUMIN SERPL-MCNC: 3.9 G/DL (ref 3.5–5)
ALBUMIN/GLOB SERPL: 1.2 G/DL (ref 1.1–1.8)
ALP SERPL-CCNC: 98 U/L (ref 38–126)
ALT SERPL W P-5'-P-CCNC: 27 U/L
ANION GAP SERPL CALCULATED.3IONS-SCNC: 6 MMOL/L (ref 5–15)
AST SERPL-CCNC: 29 U/L (ref 17–59)
BASOPHILS # BLD AUTO: 0.04 10*3/MM3 (ref 0–0.2)
BASOPHILS NFR BLD AUTO: 0.4 % (ref 0–1.5)
BILIRUB SERPL-MCNC: 1.2 MG/DL (ref 0.2–1.3)
BILIRUB UR QL STRIP: NEGATIVE
BUN SERPL-MCNC: 22 MG/DL (ref 7–23)
BUN/CREAT SERPL: 26.5 (ref 7–25)
CALCIUM SPEC-SCNC: 9.2 MG/DL (ref 8.4–10.2)
CHLORIDE SERPL-SCNC: 105 MMOL/L (ref 101–112)
CHOLEST SERPL-MCNC: 88 MG/DL (ref 150–200)
CLARITY UR: CLEAR
CO2 SERPL-SCNC: 28 MMOL/L (ref 22–30)
COLOR UR: YELLOW
CREAT SERPL-MCNC: 0.83 MG/DL (ref 0.7–1.3)
DEPRECATED RDW RBC AUTO: 48 FL (ref 37–54)
EOSINOPHIL # BLD AUTO: 0.32 10*3/MM3 (ref 0–0.4)
EOSINOPHIL NFR BLD AUTO: 3.6 % (ref 0.3–6.2)
ERYTHROCYTE [DISTWIDTH] IN BLOOD BY AUTOMATED COUNT: 14.7 % (ref 12.3–15.4)
GFR SERPL CREATININE-BSD FRML MDRD: 92 ML/MIN/1.73 (ref 49–113)
GLOBULIN UR ELPH-MCNC: 3.3 GM/DL (ref 2.3–3.5)
GLUCOSE SERPL-MCNC: 121 MG/DL (ref 70–99)
GLUCOSE UR STRIP-MCNC: NEGATIVE MG/DL
HBA1C MFR BLD: 6.7 % (ref 4.8–5.6)
HCT VFR BLD AUTO: 50.1 % (ref 37.5–51)
HDLC SERPL-MCNC: 35 MG/DL (ref 40–59)
HGB BLD-MCNC: 16.4 G/DL (ref 13–17.7)
HGB UR QL STRIP.AUTO: NEGATIVE
KETONES UR QL STRIP: NEGATIVE
LDLC SERPL CALC-MCNC: 34 MG/DL
LDLC/HDLC SERPL: 0.98 {RATIO} (ref 0–3.55)
LEUKOCYTE ESTERASE UR QL STRIP.AUTO: NEGATIVE
LYMPHOCYTES # BLD AUTO: 1.88 10*3/MM3 (ref 0.7–3.1)
LYMPHOCYTES NFR BLD AUTO: 20.9 % (ref 19.6–45.3)
MCH RBC QN AUTO: 29.3 PG (ref 26.6–33)
MCHC RBC AUTO-ENTMCNC: 32.7 G/DL (ref 31.5–35.7)
MCV RBC AUTO: 89.6 FL (ref 79–97)
MONOCYTES # BLD AUTO: 0.57 10*3/MM3 (ref 0.1–0.9)
MONOCYTES NFR BLD AUTO: 6.3 % (ref 5–12)
NEUTROPHILS NFR BLD AUTO: 6.19 10*3/MM3 (ref 1.7–7)
NEUTROPHILS NFR BLD AUTO: 68.8 % (ref 42.7–76)
NITRITE UR QL STRIP: NEGATIVE
PH UR STRIP.AUTO: <=5 [PH] (ref 5.5–8)
PLATELET # BLD AUTO: 198 10*3/MM3 (ref 140–450)
PMV BLD AUTO: 11.7 FL (ref 6–12)
POTASSIUM SERPL-SCNC: 4.3 MMOL/L (ref 3.4–5)
PROT SERPL-MCNC: 7.2 G/DL (ref 6.3–8.6)
PROT UR QL STRIP: NEGATIVE
RBC # BLD AUTO: 5.59 10*6/MM3 (ref 4.14–5.8)
SODIUM SERPL-SCNC: 139 MMOL/L (ref 137–145)
SP GR UR STRIP: 1.02 (ref 1–1.03)
T4 FREE SERPL-MCNC: 1.1 NG/DL (ref 0.93–1.7)
TRIGL SERPL-MCNC: 94 MG/DL
TSH SERPL DL<=0.05 MIU/L-ACNC: 1.48 UIU/ML (ref 0.27–4.2)
UROBILINOGEN UR QL STRIP: ABNORMAL
VLDLC SERPL-MCNC: 18.8 MG/DL
WBC # BLD AUTO: 9 10*3/MM3 (ref 3.4–10.8)

## 2020-07-06 PROCEDURE — 36415 COLL VENOUS BLD VENIPUNCTURE: CPT | Performed by: INTERNAL MEDICINE

## 2020-07-06 PROCEDURE — 80053 COMPREHEN METABOLIC PANEL: CPT | Performed by: INTERNAL MEDICINE

## 2020-07-06 PROCEDURE — 84439 ASSAY OF FREE THYROXINE: CPT | Performed by: INTERNAL MEDICINE

## 2020-07-06 PROCEDURE — 80061 LIPID PANEL: CPT | Performed by: INTERNAL MEDICINE

## 2020-07-06 PROCEDURE — 81003 URINALYSIS AUTO W/O SCOPE: CPT | Performed by: INTERNAL MEDICINE

## 2020-07-06 PROCEDURE — 83036 HEMOGLOBIN GLYCOSYLATED A1C: CPT | Performed by: INTERNAL MEDICINE

## 2020-07-06 PROCEDURE — 85025 COMPLETE CBC W/AUTO DIFF WBC: CPT | Performed by: INTERNAL MEDICINE

## 2020-07-06 PROCEDURE — 84443 ASSAY THYROID STIM HORMONE: CPT | Performed by: INTERNAL MEDICINE

## 2020-07-10 DIAGNOSIS — R56.9 SEIZURES (HCC): Chronic | ICD-10-CM

## 2020-07-10 RX ORDER — LEVETIRACETAM 500 MG/1
500 TABLET ORAL 2 TIMES DAILY
Qty: 180 TABLET | Refills: 0 | Status: SHIPPED | OUTPATIENT
Start: 2020-07-10 | End: 2020-10-12

## 2020-07-13 ENCOUNTER — OFFICE VISIT (OUTPATIENT)
Dept: FAMILY MEDICINE CLINIC | Facility: CLINIC | Age: 70
End: 2020-07-13

## 2020-07-13 DIAGNOSIS — R56.9 SEIZURES (HCC): Chronic | ICD-10-CM

## 2020-07-13 DIAGNOSIS — R73.02 IMPAIRED GLUCOSE TOLERANCE: Primary | Chronic | ICD-10-CM

## 2020-07-13 DIAGNOSIS — K21.9 GASTROESOPHAGEAL REFLUX DISEASE WITHOUT ESOPHAGITIS: Chronic | ICD-10-CM

## 2020-07-13 DIAGNOSIS — F41.1 GENERALIZED ANXIETY DISORDER: Chronic | ICD-10-CM

## 2020-07-13 DIAGNOSIS — F33.42 MAJOR DEPRESSION, RECURRENT, FULL REMISSION (HCC): Chronic | ICD-10-CM

## 2020-07-13 DIAGNOSIS — I10 ESSENTIAL HYPERTENSION: Chronic | ICD-10-CM

## 2020-07-13 DIAGNOSIS — C61 PROSTATE CANCER (HCC): Chronic | ICD-10-CM

## 2020-07-13 DIAGNOSIS — E78.2 MIXED HYPERLIPIDEMIA: Chronic | ICD-10-CM

## 2020-07-13 PROCEDURE — 99443 PR PHYS/QHP TELEPHONE EVALUATION 21-30 MIN: CPT | Performed by: INTERNAL MEDICINE

## 2020-07-13 RX ORDER — FLUOXETINE HYDROCHLORIDE 40 MG/1
40 CAPSULE ORAL DAILY
Qty: 90 CAPSULE | Refills: 1 | Status: SHIPPED | OUTPATIENT
Start: 2020-07-13 | End: 2020-12-15

## 2020-07-13 RX ORDER — ATORVASTATIN CALCIUM 80 MG/1
80 TABLET, FILM COATED ORAL EVERY EVENING
Qty: 90 TABLET | Refills: 1 | Status: SHIPPED | OUTPATIENT
Start: 2020-07-13 | End: 2021-02-04 | Stop reason: SDUPTHER

## 2020-07-13 RX ORDER — LOSARTAN POTASSIUM 25 MG/1
25 TABLET ORAL DAILY
Qty: 90 TABLET | Refills: 1 | Status: SHIPPED | OUTPATIENT
Start: 2020-07-13 | End: 2021-03-08

## 2020-07-13 RX ORDER — CLOPIDOGREL BISULFATE 75 MG/1
75 TABLET ORAL DAILY
COMMUNITY
Start: 2020-06-30 | End: 2021-12-07 | Stop reason: SDUPTHER

## 2020-07-13 RX ORDER — OMEPRAZOLE 20 MG/1
20 CAPSULE, DELAYED RELEASE ORAL DAILY
Qty: 90 CAPSULE | Refills: 1 | Status: SHIPPED | OUTPATIENT
Start: 2020-07-13 | End: 2021-04-19

## 2020-07-13 RX ORDER — METOPROLOL SUCCINATE 25 MG/1
25 TABLET, EXTENDED RELEASE ORAL DAILY
Qty: 90 TABLET | Refills: 1 | Status: SHIPPED | OUTPATIENT
Start: 2020-07-13 | End: 2021-01-07

## 2020-07-13 NOTE — PROGRESS NOTES
{Prob List  Visit [facility]  [facility]  [facility] for Surgery  BestPractice :33}     {     Telemedicine visit    You have chosen to receive care through a telephone visit. Do you consent to use a telephone visit for your medical care today? Yes      Chief Complaint   Patient presents with   • Hypertension     6 mo f/u w labs   • Hyperlipidemia     Subjective   Jaden Faith is a 70 y.o. male who is seen via telephone visit for follow-up and review of labs. He has impaired glucose tolerance and has been monitoring his dietary intake of sugars and carbohydrates.  He has hypertension and his blood pressure has been controlled.  He has hyperlipidemia and takes Lipitor daily.  He has GERD and takes omeprazole daily.  He takes fluoxetine for his anxiety and depression.  He has panic attacks and keeps lorazepam on hand to use as needed.  He has not had to take a dose in several years.  His depression is in full remission.  He has seizure disorder and takes Keppra.  He had a seizure type episode a couple of weeks ago.  This is the first episode he has experienced in 3 years.  He continues to follow with neurology.  He was diagnosed with prostate cancer in 2016.  He had a prostatectomy in 2017.  He continues to follow with urology, who manages his PSA levels.  He has mild peripheral vascular disease.  He also has coronary artery disease, with a history of stent placement.  He continues to follow with cardiology.  He was previously taking Effient.  This was discontinued and he was started on Plavix.    The following portions of the patient's history were reviewed and updated as appropriate: allergies, current medications, past family history, past medical history, past social history, past surgical history and problem list.    Review of Systems   Constitutional: Negative for chills, fatigue and fever.   HENT: Negative for congestion, sneezing, sore throat and trouble swallowing.    Eyes: Negative for visual  disturbance.   Respiratory: Negative for cough, chest tightness, shortness of breath and wheezing.    Cardiovascular: Negative for chest pain, palpitations and leg swelling.   Gastrointestinal: Negative for abdominal pain, constipation, diarrhea, nausea and vomiting.   Genitourinary: Negative for dysuria, frequency and urgency.   Musculoskeletal: Negative for neck pain.   Neurological: Negative for dizziness, weakness and headaches.   Psychiatric/Behavioral:        Patient denies any feelings of depression and has not felt down, hopeless or lost interest in any activities.   All other systems reviewed and are negative.      Objective     Physical Exam   Constitutional: He is oriented to person, place, and time. He appears well-developed and well-nourished. No distress.   Neurological: He is alert and oriented to person, place, and time.   Psychiatric: He has a normal mood and affect. His behavior is normal. Judgment and thought content normal.       Assessment/Plan             Jaden was seen today for hypertension and hyperlipidemia.    Diagnoses and all orders for this visit:    Impaired glucose tolerance  -     Hemoglobin A1c; Future    Essential hypertension  -     CBC & Differential; Future  -     Comprehensive Metabolic Panel; Future  -     T4, Free; Future  -     TSH; Future  -     Urinalysis With Culture If Indicated -; Future  -     losartan (Cozaar) 25 MG tablet; Take 1 tablet by mouth Daily.  -     metoprolol succinate XL (TOPROL-XL) 25 MG 24 hr tablet; Take 1 tablet by mouth Daily.    Mixed hyperlipidemia  -     LDL Cholesterol, Direct; Future  -     atorvastatin (LIPITOR) 80 MG tablet; Take 1 tablet by mouth Every Evening.    Gastroesophageal reflux disease without esophagitis  -     omeprazole (priLOSEC) 20 MG capsule; Take 1 capsule by mouth Daily.    Seizures (CMS/HCC)    Prostate cancer (CMS/HCC)    Major depression, recurrent, full remission (CMS/HCC)  -     FLUoxetine (PROzac) 40 MG capsule; Take 1  capsule by mouth Daily.    Generalized anxiety disorder  -     FLUoxetine (PROzac) 40 MG capsule; Take 1 capsule by mouth Daily.         Labs are reviewed with patient.  Patient's glucose is slightly elevated at 121.  His hemoglobin A1c is 6.7.  Patient will continue to watch diet to help maintain control of the glucose.  Patient understands that there is an increased risk of developing diabetes in the future.  His lipids are at goal.  He will continue with Lipitor for treatment of the hyperlipidemia.  His blood pressure is controlled, so he will continue with his current blood pressure medication. He will continue with fluoxetine for treatment of the anxiety and depression.  He will also continue with lorazepam as needed.  He will continue with omeprazole for treatment of GERD.  He will continue to follow with urology secondary to the diagnosis prostate cancer.  He will follow-up with cardiology as scheduled secondary to the coronary artery disease and peripheral vascular disease.  He will continue with Keppra for treatment of the seizure disorder, and follow-up with neurology as scheduled.    Patient understands the risks associated with this controlled medication, including tolerance and addiction.  Patient also agrees to only obtain this medication from me, and not from a another provider, unless that provider is covering for me in my absence.  Patient also agrees to be compliant in dosing, and not self adjust the dose of medication.  A signed controlled substance agreement is on file, and the patient has received a controlled substance education sheet at this a previous visit.  The patient has also signed a consent for treatment with a controlled substance as per [facility] policy. ALONDRA was obtained.    This visit has been rescheduled as a phone visit to comply with patient safety concerns in accordance with CDC recommendations. Total time of discussion was 23 minutes.      PHQ-2/PHQ-9 Depression  Screening 1/13/2020   Little interest or pleasure in doing things 0   Feeling down, depressed, or hopeless 0   Trouble falling or staying asleep, or sleeping too much -   Feeling tired or having little energy -   Poor appetite or overeating -   Feeling bad about yourself - or that you are a failure or have let yourself or your family down -   Trouble concentrating on things, such as reading the newspaper or watching television -   Moving or speaking so slowly that other people could have noticed. Or the opposite - being so fidgety or restless that you have been moving around a lot more than usual -   Thoughts that you would be better off dead, or of hurting yourself in some way -   Total Score 0   If you checked off any problems, how difficult have these problems made it for you to do your work, take care of things at home, or get along with other people? -       Lab on 07/06/2020   Component Date Value Ref Range Status   • Glucose 07/06/2020 121* 70 - 99 mg/dL Final   • BUN 07/06/2020 22  7 - 23 mg/dL Final   • Creatinine 07/06/2020 0.83  0.70 - 1.30 mg/dL Final   • Sodium 07/06/2020 139  137 - 145 mmol/L Final   • Potassium 07/06/2020 4.3  3.4 - 5.0 mmol/L Final   • Chloride 07/06/2020 105  101 - 112 mmol/L Final   • CO2 07/06/2020 28.0  22.0 - 30.0 mmol/L Final   • Calcium 07/06/2020 9.2  8.4 - 10.2 mg/dL Final   • Total Protein 07/06/2020 7.2  6.3 - 8.6 g/dL Final   • Albumin 07/06/2020 3.90  3.50 - 5.00 g/dL Final   • ALT (SGPT) 07/06/2020 27  <=50 U/L Final   • AST (SGOT) 07/06/2020 29  17 - 59 U/L Final   • Alkaline Phosphatase 07/06/2020 98  38 - 126 U/L Final   • Total Bilirubin 07/06/2020 1.2  0.2 - 1.3 mg/dL Final   • eGFR Non African Amer 07/06/2020 92  49 - 113 mL/min/1.73 Final   • Globulin 07/06/2020 3.3  2.3 - 3.5 gm/dL Final   • A/G Ratio 07/06/2020 1.2  1.1 - 1.8 g/dL Final   • BUN/Creatinine Ratio 07/06/2020 26.5* 7.0 - 25.0 Final   • Anion Gap 07/06/2020 6.0  5.0 - 15.0 mmol/L Final   • Free T4  07/06/2020 1.10  0.93 - 1.70 ng/dL Final   • TSH 07/06/2020 1.480  0.270 - 4.200 uIU/mL Final   • Color, UA 07/06/2020 Yellow  Yellow, Straw Final   • Appearance, UA 07/06/2020 Clear  Clear Final   • pH, UA 07/06/2020 <=5.0* 5.5 - 8.0 Final   • Specific Gravity, UA 07/06/2020 1.025  1.005 - 1.030 Final   • Glucose, UA 07/06/2020 Negative  Negative Final   • Ketones, UA 07/06/2020 Negative  Negative Final   • Bilirubin, UA 07/06/2020 Negative  Negative Final   • Blood, UA 07/06/2020 Negative  Negative Final   • Protein, UA 07/06/2020 Negative  Negative Final   • Leuk Esterase, UA 07/06/2020 Negative  Negative Final   • Nitrite, UA 07/06/2020 Negative  Negative Final   • Urobilinogen, UA 07/06/2020 0.2 E.U./dL  0.2 - 1.0 E.U./dL Final   • Hemoglobin A1C 07/06/2020 6.70* 4.80 - 5.60 % Final   • Total Cholesterol 07/06/2020 88* 150 - 200 mg/dL Final   • Triglycerides 07/06/2020 94  <=150 mg/dL Final   • HDL Cholesterol 07/06/2020 35* 40 - 59 mg/dL Final   • LDL Cholesterol  07/06/2020 34  <=100 mg/dL Final   • VLDL Cholesterol 07/06/2020 18.8  mg/dL Final   • LDL/HDL Ratio 07/06/2020 0.98  0.00 - 3.55 Final   • WBC 07/06/2020 9.00  3.40 - 10.80 10*3/mm3 Final   • RBC 07/06/2020 5.59  4.14 - 5.80 10*6/mm3 Final   • Hemoglobin 07/06/2020 16.4  13.0 - 17.7 g/dL Final   • Hematocrit 07/06/2020 50.1  37.5 - 51.0 % Final   • MCV 07/06/2020 89.6  79.0 - 97.0 fL Final   • MCH 07/06/2020 29.3  26.6 - 33.0 pg Final   • MCHC 07/06/2020 32.7  31.5 - 35.7 g/dL Final   • RDW 07/06/2020 14.7  12.3 - 15.4 % Final   • RDW-SD 07/06/2020 48.0  37.0 - 54.0 fl Final   • MPV 07/06/2020 11.7  6.0 - 12.0 fL Final   • Platelets 07/06/2020 198  140 - 450 10*3/mm3 Final   • Neutrophil % 07/06/2020 68.8  42.7 - 76.0 % Final   • Lymphocyte % 07/06/2020 20.9  19.6 - 45.3 % Final   • Monocyte % 07/06/2020 6.3  5.0 - 12.0 % Final   • Eosinophil % 07/06/2020 3.6  0.3 - 6.2 % Final   • Basophil % 07/06/2020 0.4  0.0 - 1.5 % Final   • Neutrophils,  Absolute 07/06/2020 6.19  1.70 - 7.00 10*3/mm3 Final   • Lymphocytes, Absolute 07/06/2020 1.88  0.70 - 3.10 10*3/mm3 Final   • Monocytes, Absolute 07/06/2020 0.57  0.10 - 0.90 10*3/mm3 Final   • Eosinophils, Absolute 07/06/2020 0.32  0.00 - 0.40 10*3/mm3 Final   • Basophils, Absolute 07/06/2020 0.04  0.00 - 0.20 10*3/mm3 Final   ]

## 2020-10-11 DIAGNOSIS — R56.9 SEIZURES (HCC): Chronic | ICD-10-CM

## 2020-10-12 RX ORDER — LEVETIRACETAM 500 MG/1
TABLET ORAL
Qty: 180 TABLET | Refills: 1 | Status: SHIPPED | OUTPATIENT
Start: 2020-10-12 | End: 2020-12-07 | Stop reason: SDUPTHER

## 2020-12-07 ENCOUNTER — OFFICE VISIT (OUTPATIENT)
Dept: FAMILY MEDICINE CLINIC | Facility: CLINIC | Age: 70
End: 2020-12-07

## 2020-12-07 VITALS
HEIGHT: 67 IN | WEIGHT: 218 LBS | OXYGEN SATURATION: 99 % | HEART RATE: 74 BPM | BODY MASS INDEX: 34.21 KG/M2 | SYSTOLIC BLOOD PRESSURE: 118 MMHG | TEMPERATURE: 98.7 F | DIASTOLIC BLOOD PRESSURE: 68 MMHG

## 2020-12-07 DIAGNOSIS — F41.0 PANIC ATTACKS: Chronic | ICD-10-CM

## 2020-12-07 DIAGNOSIS — R56.9 SEIZURES (HCC): Primary | Chronic | ICD-10-CM

## 2020-12-07 DIAGNOSIS — I10 ESSENTIAL HYPERTENSION: Chronic | ICD-10-CM

## 2020-12-07 DIAGNOSIS — F41.1 GENERALIZED ANXIETY DISORDER: Chronic | ICD-10-CM

## 2020-12-07 DIAGNOSIS — F33.42 MAJOR DEPRESSION, RECURRENT, FULL REMISSION (HCC): Chronic | ICD-10-CM

## 2020-12-07 DIAGNOSIS — Z79.899 DRUG THERAPY: ICD-10-CM

## 2020-12-07 PROCEDURE — 99214 OFFICE O/P EST MOD 30 MIN: CPT | Performed by: INTERNAL MEDICINE

## 2020-12-07 RX ORDER — LEVETIRACETAM 500 MG/1
TABLET ORAL
Qty: 270 TABLET | Refills: 1 | Status: SHIPPED | OUTPATIENT
Start: 2020-12-07 | End: 2021-07-01

## 2020-12-07 RX ORDER — LORAZEPAM 0.5 MG/1
0.5 TABLET ORAL EVERY 8 HOURS PRN
Qty: 30 TABLET | Refills: 0 | Status: SHIPPED | OUTPATIENT
Start: 2020-12-07 | End: 2021-12-07 | Stop reason: SDUPTHER

## 2020-12-07 RX ORDER — NITROGLYCERIN 0.4 MG/1
0.4 TABLET SUBLINGUAL
COMMUNITY

## 2020-12-07 NOTE — PROGRESS NOTES
Chief Complaint   Patient presents with   • Hospital Follow Up Visit     ER f/u from [facility].     Subjective   Jaden Faith is a 70 y.o. male who presents to the office for an ER follow-up.  He has a diagnosis of seizure disorder and follows with neurology.  He takes Keppra 500 mg twice daily.  He also takes Ativan 0.5 mg every 8 hours as needed for anxiety or seizures.  He has depression and anxiety and takes fluoxetine 40 mg daily.  There has been some question if his seizures are true seizures or if they are related to his anxiety.  His neurologist has kept him on the Keppra for now.    On 12/1/2020 he had an episode of anxiety and headache followed by a brief loss of consciousness.  The event was not witnessed.  It is unknown if he had any seizure type activity.  He began feeling weak and felt like he was going to pass out.  He crawled to the bathroom and remembers waking up on the floor of the bathroom.  There was no bladder incontinence, although he did lose bowel control.  His mental status seemed normal after the event.  He was taken by ambulance to the emergency room for evaluation.  He had a Keppra level drawn, but results were pending at the time of his ER discharge.  He was given a dose of lorazepam in the ER, and he felt much better.  He was then discharged home to follow-up with me and his neurologist.  No medication changes were made.    He has not had any further episodes since the ER visit.  The Keppra level was resulted and the level was decreased at 8.  The normal range is 12-46.    The following portions of the patient's history were reviewed and updated as appropriate: allergies, current medications, past family history, past medical history, past social history, past surgical history and problem list.    Review of Systems   Constitutional: Negative for chills, fatigue and fever.   HENT: Negative for congestion, sneezing, sore throat and trouble swallowing.    Eyes: Negative for visual  "disturbance.   Respiratory: Negative for cough, chest tightness, shortness of breath and wheezing.    Cardiovascular: Negative for chest pain, palpitations and leg swelling.   Gastrointestinal: Negative for abdominal pain, constipation, diarrhea, nausea and vomiting.   Genitourinary: Negative for dysuria, frequency and urgency.   Musculoskeletal: Negative for neck pain.   Skin: Negative for rash.   Neurological: Negative for dizziness, weakness and headaches.   Psychiatric/Behavioral:        Patient denies any feelings of depression and has not felt down, hopeless or lost interest in any activities.   All other systems reviewed and are negative.      Objective   Vitals:    12/07/20 1312   BP: 118/68   Pulse: 74   Temp: 98.7 °F (37.1 °C)   TempSrc: Oral   SpO2: 99%   Weight: 98.9 kg (218 lb)   Height: 170.2 cm (67\")   PainSc: 0-No pain   PainLoc: Generalized      Body mass index is 34.14 kg/m².  Physical Exam  Vitals signs and nursing note reviewed.   Constitutional:       General: He is not in acute distress.     Appearance: He is well-developed.   HENT:      Head: Normocephalic and atraumatic.      Nose: Nose normal.      Mouth/Throat:      Pharynx: No oropharyngeal exudate.   Eyes:      General: No scleral icterus.     Conjunctiva/sclera: Conjunctivae normal.      Pupils: Pupils are equal, round, and reactive to light.   Neck:      Musculoskeletal: Normal range of motion and neck supple.   Cardiovascular:      Rate and Rhythm: Normal rate and regular rhythm.      Heart sounds: Normal heart sounds. No murmur. No friction rub. No gallop.    Pulmonary:      Effort: Pulmonary effort is normal. No respiratory distress.      Breath sounds: Normal breath sounds. No wheezing or rales.   Abdominal:      General: Bowel sounds are normal. There is no distension.      Palpations: Abdomen is soft.      Tenderness: There is no abdominal tenderness. There is no guarding or rebound.   Musculoskeletal: Normal range of motion. "   Lymphadenopathy:      Cervical: No cervical adenopathy.   Skin:     General: Skin is warm and dry.      Findings: No rash.   Neurological:      Mental Status: He is alert and oriented to person, place, and time.      Cranial Nerves: No cranial nerve deficit.   Psychiatric:         Behavior: Behavior normal.         Thought Content: Thought content normal.         Judgment: Judgment normal.         Assessment/Plan   Diagnoses and all orders for this visit:    1. Seizures (CMS/HCC) (Primary)  -     levETIRAcetam (KEPPRA) 500 MG tablet; Take 1 tablet by mouth Every Morning AND 2 tablets Every Night.  Dispense: 270 tablet; Refill: 1  -     LORazepam (ATIVAN) 0.5 MG tablet; Take 1 tablet by mouth Every 8 (Eight) Hours As Needed for Anxiety or Seizures (panic attacks).  Dispense: 30 tablet; Refill: 0  -     Levetiracetam Level (Keppra); Future    2. Major depression, recurrent, full remission (CMS/HCC)    3. Panic attacks  -     LORazepam (ATIVAN) 0.5 MG tablet; Take 1 tablet by mouth Every 8 (Eight) Hours As Needed for Anxiety or Seizures (panic attacks).  Dispense: 30 tablet; Refill: 0    4. Generalized anxiety disorder    5. Essential hypertension    6. Drug therapy  -     Levetiracetam Level (Keppra); Future        I reviewed records from his ER visit.  These are discussed above in the history present illness.  His blood pressure is normal today, and he will continue with his current blood pressure medication.  He will continue with the current dose of fluoxetine for his anxiety symptoms.  He will continue with lorazepam 0.5 mg every 8 hours as needed for anxiety or seizure episodes.  He will follow-up with neurology as scheduled for the seizure disorder.  Since his Keppra level was decreased at 8, and he has not missed any doses, I will increase this to 3 tablets daily.  He will take 1 each morning and 2 at bedtime.    Patient understands the risks associated with this controlled medication, including tolerance and  addiction.  Patient also agrees to only obtain this medication from me, and not from a another provider, unless that provider is covering for me in my absence.  Patient also agrees to be compliant in dosing, and not self adjust the dose of medication.  A signed controlled substance agreement is on file, and the patient has received a controlled substance education sheet at this or a previous visit.  The patient has also signed a consent for treatment with a controlled substance as per [facility] policy. ALONDRA is obtained.       PHQ-2/PHQ-9 Depression Screening 12/7/2020   Little interest or pleasure in doing things 0   Feeling down, depressed, or hopeless 0   Trouble falling or staying asleep, or sleeping too much 1   Feeling tired or having little energy 0   Poor appetite or overeating 0   Feeling bad about yourself - or that you are a failure or have let yourself or your family down 0   Trouble concentrating on things, such as reading the newspaper or watching television 0   Moving or speaking so slowly that other people could have noticed. Or the opposite - being so fidgety or restless that you have been moving around a lot more than usual 0   Thoughts that you would be better off dead, or of hurting yourself in some way 0   Total Score 1   If you checked off any problems, how difficult have these problems made it for you to do your work, take care of things at home, or get along with other people? Somewhat difficult

## 2020-12-14 DIAGNOSIS — F41.1 GENERALIZED ANXIETY DISORDER: Chronic | ICD-10-CM

## 2020-12-14 DIAGNOSIS — F33.42 MAJOR DEPRESSION, RECURRENT, FULL REMISSION (HCC): Chronic | ICD-10-CM

## 2020-12-15 RX ORDER — FLUOXETINE HYDROCHLORIDE 40 MG/1
CAPSULE ORAL
Qty: 90 CAPSULE | Refills: 1 | Status: SHIPPED | OUTPATIENT
Start: 2020-12-15 | End: 2021-08-06 | Stop reason: SDUPTHER

## 2021-01-06 DIAGNOSIS — I10 ESSENTIAL HYPERTENSION: Chronic | ICD-10-CM

## 2021-01-07 RX ORDER — METOPROLOL SUCCINATE 25 MG/1
TABLET, EXTENDED RELEASE ORAL
Qty: 90 TABLET | Refills: 0 | Status: SHIPPED | OUTPATIENT
Start: 2021-01-07 | End: 2021-04-07

## 2021-01-26 ENCOUNTER — LAB (OUTPATIENT)
Dept: LAB | Facility: OTHER | Age: 71
End: 2021-01-26

## 2021-01-26 DIAGNOSIS — R73.02 IMPAIRED GLUCOSE TOLERANCE: Chronic | ICD-10-CM

## 2021-01-26 DIAGNOSIS — Z79.899 DRUG THERAPY: ICD-10-CM

## 2021-01-26 DIAGNOSIS — R56.9 SEIZURES (HCC): Chronic | ICD-10-CM

## 2021-01-26 DIAGNOSIS — E78.2 MIXED HYPERLIPIDEMIA: Chronic | ICD-10-CM

## 2021-01-26 DIAGNOSIS — I10 ESSENTIAL HYPERTENSION: ICD-10-CM

## 2021-01-26 LAB
ALBUMIN SERPL-MCNC: 4.1 G/DL (ref 3.5–5)
ALBUMIN/GLOB SERPL: 1.2 G/DL (ref 1.1–1.8)
ALP SERPL-CCNC: 94 U/L (ref 38–126)
ALT SERPL W P-5'-P-CCNC: 33 U/L
ANION GAP SERPL CALCULATED.3IONS-SCNC: 6 MMOL/L (ref 5–15)
ARTICHOKE IGE QN: 46 MG/DL (ref 0–100)
AST SERPL-CCNC: 34 U/L (ref 17–59)
BACTERIA UR QL AUTO: ABNORMAL /HPF
BASOPHILS # BLD MANUAL: 0.08 10*3/MM3 (ref 0–0.2)
BASOPHILS NFR BLD AUTO: 1 % (ref 0–1.5)
BILIRUB SERPL-MCNC: 0.9 MG/DL (ref 0.2–1.3)
BILIRUB UR QL STRIP: NEGATIVE
BUN SERPL-MCNC: 22 MG/DL (ref 7–23)
BUN/CREAT SERPL: 25.3 (ref 7–25)
CALCIUM SPEC-SCNC: 9.1 MG/DL (ref 8.4–10.2)
CHLORIDE SERPL-SCNC: 104 MMOL/L (ref 101–112)
CLARITY UR: CLEAR
CO2 SERPL-SCNC: 28 MMOL/L (ref 22–30)
COLOR UR: YELLOW
CREAT SERPL-MCNC: 0.87 MG/DL (ref 0.7–1.3)
DEPRECATED RDW RBC AUTO: 46.9 FL (ref 37–54)
EOSINOPHIL # BLD MANUAL: 0.25 10*3/MM3 (ref 0–0.4)
EOSINOPHIL NFR BLD MANUAL: 3 % (ref 0.3–6.2)
ERYTHROCYTE [DISTWIDTH] IN BLOOD BY AUTOMATED COUNT: 14.6 % (ref 12.3–15.4)
GFR SERPL CREATININE-BSD FRML MDRD: 87 ML/MIN/1.73 (ref 49–113)
GLOBULIN UR ELPH-MCNC: 3.3 GM/DL (ref 2.3–3.5)
GLUCOSE SERPL-MCNC: 123 MG/DL (ref 70–99)
GLUCOSE UR STRIP-MCNC: NEGATIVE MG/DL
HBA1C MFR BLD: 6.4 % (ref 4.8–5.6)
HCT VFR BLD AUTO: 51.8 % (ref 37.5–51)
HGB BLD-MCNC: 16.9 G/DL (ref 13–17.7)
HGB UR QL STRIP.AUTO: ABNORMAL
HYALINE CASTS UR QL AUTO: ABNORMAL /LPF
KETONES UR QL STRIP: NEGATIVE
LEUKOCYTE ESTERASE UR QL STRIP.AUTO: NEGATIVE
LYMPHOCYTES # BLD MANUAL: 1.56 10*3/MM3 (ref 0.7–3.1)
LYMPHOCYTES NFR BLD MANUAL: 19 % (ref 19.6–45.3)
LYMPHOCYTES NFR BLD MANUAL: 8 % (ref 5–12)
MCH RBC QN AUTO: 28.9 PG (ref 26.6–33)
MCHC RBC AUTO-ENTMCNC: 32.6 G/DL (ref 31.5–35.7)
MCV RBC AUTO: 88.5 FL (ref 79–97)
MONOCYTES # BLD AUTO: 0.66 10*3/MM3 (ref 0.1–0.9)
MUCOUS THREADS URNS QL MICRO: ABNORMAL /HPF
NEUTROPHILS # BLD AUTO: 5.67 10*3/MM3 (ref 1.7–7)
NEUTROPHILS NFR BLD MANUAL: 69 % (ref 42.7–76)
NITRITE UR QL STRIP: NEGATIVE
PH UR STRIP.AUTO: 6 [PH] (ref 5.5–8)
PLATELET # BLD AUTO: 199 10*3/MM3 (ref 140–450)
PMV BLD AUTO: 11.7 FL (ref 6–12)
POTASSIUM SERPL-SCNC: 4.2 MMOL/L (ref 3.4–5)
PROT SERPL-MCNC: 7.4 G/DL (ref 6.3–8.6)
PROT UR QL STRIP: NEGATIVE
RBC # BLD AUTO: 5.85 10*6/MM3 (ref 4.14–5.8)
RBC # UR: ABNORMAL /HPF
RBC MORPH BLD: NORMAL
REF LAB TEST METHOD: ABNORMAL
SMALL PLATELETS BLD QL SMEAR: ADEQUATE
SODIUM SERPL-SCNC: 138 MMOL/L (ref 137–145)
SP GR UR STRIP: >=1.03 (ref 1–1.03)
SQUAMOUS #/AREA URNS HPF: ABNORMAL /HPF
T4 FREE SERPL-MCNC: 1.25 NG/DL (ref 0.93–1.7)
TSH SERPL DL<=0.05 MIU/L-ACNC: 1.89 UIU/ML (ref 0.27–4.2)
UROBILINOGEN UR QL STRIP: ABNORMAL
WBC # BLD AUTO: 8.22 10*3/MM3 (ref 3.4–10.8)
WBC MORPH BLD: NORMAL
WBC UR QL AUTO: ABNORMAL /HPF

## 2021-01-26 PROCEDURE — 83036 HEMOGLOBIN GLYCOSYLATED A1C: CPT | Performed by: INTERNAL MEDICINE

## 2021-01-26 PROCEDURE — 80053 COMPREHEN METABOLIC PANEL: CPT | Performed by: INTERNAL MEDICINE

## 2021-01-26 PROCEDURE — 81001 URINALYSIS AUTO W/SCOPE: CPT | Performed by: INTERNAL MEDICINE

## 2021-01-26 PROCEDURE — 80177 DRUG SCRN QUAN LEVETIRACETAM: CPT | Performed by: INTERNAL MEDICINE

## 2021-01-26 PROCEDURE — 85025 COMPLETE CBC W/AUTO DIFF WBC: CPT | Performed by: INTERNAL MEDICINE

## 2021-01-26 PROCEDURE — 83721 ASSAY OF BLOOD LIPOPROTEIN: CPT | Performed by: INTERNAL MEDICINE

## 2021-01-26 PROCEDURE — 36415 COLL VENOUS BLD VENIPUNCTURE: CPT | Performed by: INTERNAL MEDICINE

## 2021-01-26 PROCEDURE — 84443 ASSAY THYROID STIM HORMONE: CPT | Performed by: INTERNAL MEDICINE

## 2021-01-26 PROCEDURE — 84439 ASSAY OF FREE THYROXINE: CPT | Performed by: INTERNAL MEDICINE

## 2021-01-28 LAB — LEVETIRACETAM SERPL-MCNC: 9.7 UG/ML (ref 10–40)

## 2021-02-04 ENCOUNTER — OFFICE VISIT (OUTPATIENT)
Dept: FAMILY MEDICINE CLINIC | Facility: CLINIC | Age: 71
End: 2021-02-04

## 2021-02-04 VITALS
HEIGHT: 67 IN | SYSTOLIC BLOOD PRESSURE: 124 MMHG | HEART RATE: 73 BPM | DIASTOLIC BLOOD PRESSURE: 80 MMHG | WEIGHT: 223 LBS | BODY MASS INDEX: 35 KG/M2 | OXYGEN SATURATION: 99 % | TEMPERATURE: 97.7 F

## 2021-02-04 DIAGNOSIS — I73.9 PERIPHERAL VASCULAR DISEASE (HCC): Chronic | ICD-10-CM

## 2021-02-04 DIAGNOSIS — F33.42 MAJOR DEPRESSION, RECURRENT, FULL REMISSION (HCC): Chronic | ICD-10-CM

## 2021-02-04 DIAGNOSIS — I10 ESSENTIAL HYPERTENSION: Chronic | ICD-10-CM

## 2021-02-04 DIAGNOSIS — E78.2 MIXED HYPERLIPIDEMIA: Chronic | ICD-10-CM

## 2021-02-04 DIAGNOSIS — Z00.00 MEDICARE ANNUAL WELLNESS VISIT, SUBSEQUENT: Primary | ICD-10-CM

## 2021-02-04 DIAGNOSIS — I25.118 ATHEROSCLEROTIC HEART DISEASE OF NATIVE CORONARY ARTERY WITH OTHER FORMS OF ANGINA PECTORIS (HCC): Chronic | ICD-10-CM

## 2021-02-04 DIAGNOSIS — R56.9 SEIZURES (HCC): Chronic | ICD-10-CM

## 2021-02-04 DIAGNOSIS — Z79.899 DRUG THERAPY: ICD-10-CM

## 2021-02-04 DIAGNOSIS — L98.9 SKIN LESION: ICD-10-CM

## 2021-02-04 DIAGNOSIS — R73.02 IMPAIRED GLUCOSE TOLERANCE: Chronic | ICD-10-CM

## 2021-02-04 DIAGNOSIS — K21.9 GASTROESOPHAGEAL REFLUX DISEASE WITHOUT ESOPHAGITIS: Chronic | ICD-10-CM

## 2021-02-04 DIAGNOSIS — C61 PROSTATE CANCER (HCC): Chronic | ICD-10-CM

## 2021-02-04 DIAGNOSIS — F41.1 GENERALIZED ANXIETY DISORDER: Chronic | ICD-10-CM

## 2021-02-04 PROCEDURE — G0439 PPPS, SUBSEQ VISIT: HCPCS | Performed by: INTERNAL MEDICINE

## 2021-02-04 PROCEDURE — 99214 OFFICE O/P EST MOD 30 MIN: CPT | Performed by: INTERNAL MEDICINE

## 2021-02-04 RX ORDER — ATORVASTATIN CALCIUM 80 MG/1
80 TABLET, FILM COATED ORAL EVERY EVENING
Qty: 90 TABLET | Refills: 1 | Status: SHIPPED | OUTPATIENT
Start: 2021-02-04 | End: 2021-08-06 | Stop reason: SDUPTHER

## 2021-02-04 NOTE — PROGRESS NOTES
QUICK REFERENCE INFORMATION:  The ABCs of the Annual Wellness Visit    Subsequent Medicare Wellness Visit    HEALTH RISK ASSESSMENT    1950    Recent Hospitalizations:  No hospitalization(s) within the last year.        Current Medical Providers:  Patient Care Team:  Ruiz Li MD as PCP - Garth Sahni MD as Consulting Physician (Neurology)  Gordon Lorenzo MD as Consulting Physician (Urology)  Ed Colunga MD as Surgeon (Otolaryngology)  Bharath Aguirre MD as Cardiologist (Cardiology)        Smoking Status:  Social History     Tobacco Use   Smoking Status Never Smoker   Smokeless Tobacco Never Used       Alcohol Consumption:  Social History     Substance and Sexual Activity   Alcohol Use No       Depression Screen:   PHQ-2/PHQ-9 Depression Screening 2/4/2021   Little interest or pleasure in doing things 0   Feeling down, depressed, or hopeless 0   Trouble falling or staying asleep, or sleeping too much -   Feeling tired or having little energy -   Poor appetite or overeating -   Feeling bad about yourself - or that you are a failure or have let yourself or your family down -   Trouble concentrating on things, such as reading the newspaper or watching television -   Moving or speaking so slowly that other people could have noticed. Or the opposite - being so fidgety or restless that you have been moving around a lot more than usual -   Thoughts that you would be better off dead, or of hurting yourself in some way -   Total Score 0   If you checked off any problems, how difficult have these problems made it for you to do your work, take care of things at home, or get along with other people? -       Health Habits and Functional and Cognitive Screening:  Functional & Cognitive Status 2/4/2021   Do you have difficulty preparing food and eating? No   Do you have difficulty bathing yourself, getting dressed or grooming yourself? No   Do you have difficulty using the toilet? No    Do you have difficulty moving around from place to place? No   Do you have trouble with steps or getting out of a bed or a chair? No   Current Diet Well Balanced Diet   Dental Exam Not up to date   Eye Exam Not up to date   Exercise (times per week) 5 times per week   Current Exercise Activities Include Walking   Do you need help using the phone?  No   Are you deaf or do you have serious difficulty hearing?  No   Do you need help with transportation? No   Do you need help shopping? No   Do you need help preparing meals?  No   Do you need help with housework?  No   Do you need help with laundry? No   Do you need help taking your medications? No   Do you need help managing money? No   Do you ever drive or ride in a car without wearing a seat belt? No   Have you felt unusual stress, anger or loneliness in the last month? No   Who do you live with? Spouse   If you need help, do you have trouble finding someone available to you? No   Have you been bothered in the last four weeks by sexual problems? No   Do you have difficulty concentrating, remembering or making decisions? Yes           Does the patient have evidence of cognitive impairment? No    Aspirin use counseling: Taking ASA appropriately as indicated      Recent Lab Results:  CMP:  Lab Results   Component Value Date     (H) 04/08/2019    BUN 22 01/26/2021    CREATININE 0.87 01/26/2021    EGFRIFNONA 87 01/26/2021    EGFRIFAFRI 102 04/08/2019    BCR 25.3 (H) 01/26/2021     01/26/2021    K 4.2 01/26/2021    CO2 28.0 01/26/2021    CALCIUM 9.1 01/26/2021    ALBUMIN 4.10 01/26/2021    BILITOT 0.9 01/26/2021    ALKPHOS 94 01/26/2021    AST 34 01/26/2021    ALT 33 01/26/2021     Lipid Panel:  Lab Results   Component Value Date    CHOL 88 (L) 07/06/2020    TRIG 94 07/06/2020    HDL 35 (L) 07/06/2020    VLDL 18.8 07/06/2020    LDLHDL 0.98 07/06/2020     HbA1c:  Lab Results   Component Value Date    HGBA1C 6.40 (H) 01/26/2021       Visual Acuity:  No exam  data present    Age-appropriate Screening Schedule:  Refer to the list below for future screening recommendations based on patient's age, sex and/or medical conditions. Orders for these recommended tests are listed in the plan section. The patient has been provided with a written plan.    Health Maintenance   Topic Date Due   • ZOSTER VACCINE (1 of 2) 02/13/2001   • PROSTATE CANCER SCREENING  03/05/2021   • LIPID PANEL  01/26/2022   • COLONOSCOPY  06/06/2026   • TDAP/TD VACCINES (2 - Td) 11/17/2026   • INFLUENZA VACCINE  Completed        Subjective   History of Present Illness    Jaden Faith is a 70 y.o. male who presents for an Subsequent Wellness Visit.    The following portions of the patient's history were reviewed and updated as appropriate: allergies, current medications, past family history, past medical history, past social history, past surgical history and problem list.    Outpatient Medications Prior to Visit   Medication Sig Dispense Refill   • aspirin 81 MG EC tablet Take 1 tablet by mouth Daily. 30 tablet 0   • clopidogrel (PLAVIX) 75 MG tablet Take 75 mg by mouth Daily.     • FLUoxetine (PROzac) 40 MG capsule Take 1 capsule by mouth once daily 90 capsule 1   • levETIRAcetam (KEPPRA) 500 MG tablet Take 1 tablet by mouth Every Morning AND 2 tablets Every Night. 270 tablet 1   • LORazepam (ATIVAN) 0.5 MG tablet Take 1 tablet by mouth Every 8 (Eight) Hours As Needed for Anxiety or Seizures (panic attacks). 30 tablet 0   • losartan (Cozaar) 25 MG tablet Take 1 tablet by mouth Daily. 90 tablet 1   • metoprolol succinate XL (TOPROL-XL) 25 MG 24 hr tablet Take 1 tablet by mouth once daily 90 tablet 0   • Multiple Vitamins-Minerals (MULTIVITAMIN ADULT PO) Take 1 tablet by mouth Daily.     • nitroglycerin (NITROSTAT) 0.4 MG SL tablet Place 0.4 mg under the tongue.     • omeprazole (priLOSEC) 20 MG capsule Take 1 capsule by mouth Daily. 90 capsule 1   • triamcinolone (KENALOG) 0.1 % cream Apply   "topically to the appropriate area as directed 2 (Two) Times a Day. 60 g 0   • atorvastatin (LIPITOR) 80 MG tablet Take 1 tablet by mouth Every Evening. 90 tablet 1     No facility-administered medications prior to visit.        Patient Active Problem List   Diagnosis   • Mixed hyperlipidemia   • Gastroesophageal reflux disease without esophagitis   • Non-seasonal allergic rhinitis due to pollen   • Essential hypertension   • Peripheral vascular disease (CMS/HCC)   • Impaired glucose tolerance   • Major depression, recurrent, full remission (CMS/HCC)   • Seizures (CMS/HCC)   • Prostate cancer (CMS/HCC)   • Panic attacks   • Generalized anxiety disorder   • Atherosclerotic heart disease of native coronary artery with other forms of angina pectoris (CMS/HCC)       Advance Care Planning:  ACP discussion was held with the patient during this visit. Patient does not have an advance directive, declines further assistance.     Identification of Risk Factors:  Risk factors include: weight , unhealthy diet and depression.    Review of Systems    Compared to one year ago, the patient feels his physical health is the same.  Compared to one year ago, the patient feels his mental health is the same.    Objective     Physical Exam    Vitals:    02/04/21 0938   BP: 124/80   Pulse: 73   Temp: 97.7 °F (36.5 °C)   TempSrc: Oral   SpO2: 99%   Weight: 101 kg (223 lb)   Height: 170.2 cm (67\")   PainSc: 0-No pain   PainLoc: Generalized       Patient's Body mass index is 34.93 kg/m². BMI is above normal parameters. Recommendations include: educational material.      Assessment/Plan   Patient Self-Management and Personalized Health Advice  The patient has been provided with information about: diet, exercise and weight management and preventive services including:   · Diabetes screening, see lab orders, Exercise counseling provided, Fall Risk assessment done, Nutrition counseling provided.  Recommend Shingrix vaccine for shingles.  Recommend " COVID-19 vaccine when available.    Visit Diagnoses:    ICD-10-CM ICD-9-CM   1. Medicare annual wellness visit, subsequent  Z00.00 V70.0   2. Impaired glucose tolerance  R73.02 790.22   3. Essential hypertension  I10 401.9   4. Mixed hyperlipidemia  E78.2 272.2   5. Peripheral vascular disease (CMS/HCC)  I73.9 443.9   6. Atherosclerotic heart disease of native coronary artery with other forms of angina pectoris (CMS/HCC)  I25.118 414.01     413.9   7. Gastroesophageal reflux disease without esophagitis  K21.9 530.81   8. Prostate cancer (CMS/HCC)  C61 185   9. Major depression, recurrent, full remission (CMS/HCC)  F33.42 296.36   10. Generalized anxiety disorder  F41.1 300.02   11. Seizures (CMS/HCC)  R56.9 780.39   12. Skin lesion  L98.9 709.9   13. Drug therapy  Z79.899 V58.69       Orders Placed This Encounter   Procedures   • Comprehensive Metabolic Panel     Standing Status:   Future     Standing Expiration Date:   2/4/2022   • T4, Free     Standing Status:   Future     Standing Expiration Date:   2/4/2022   • TSH     Standing Status:   Future     Standing Expiration Date:   2/4/2022   • Urinalysis With Culture If Indicated -     Standing Status:   Future     Standing Expiration Date:   2/4/2022   • Hemoglobin A1c     Standing Status:   Future     Standing Expiration Date:   2/4/2022   • Lipid Panel     Standing Status:   Future     Standing Expiration Date:   2/4/2022   • Levetiracetam Level (Keppra)     Standing Status:   Future     Standing Expiration Date:   2/4/2022   • Ambulatory Referral to Dermatology     Referral Priority:   Routine     Referral Type:   Consultation     Referral Reason:   Specialty Services Required     Referred to Provider:   Ed Thompson MD     Requested Specialty:   Dermatology     Number of Visits Requested:   1   • CBC & Differential     Standing Status:   Future     Standing Expiration Date:   2/4/2022     Order Specific Question:   Manual Differential     Answer:   No        Outpatient Encounter Medications as of 2/4/2021   Medication Sig Dispense Refill   • aspirin 81 MG EC tablet Take 1 tablet by mouth Daily. 30 tablet 0   • atorvastatin (LIPITOR) 80 MG tablet Take 1 tablet by mouth Every Evening. 90 tablet 1   • clopidogrel (PLAVIX) 75 MG tablet Take 75 mg by mouth Daily.     • FLUoxetine (PROzac) 40 MG capsule Take 1 capsule by mouth once daily 90 capsule 1   • levETIRAcetam (KEPPRA) 500 MG tablet Take 1 tablet by mouth Every Morning AND 2 tablets Every Night. 270 tablet 1   • LORazepam (ATIVAN) 0.5 MG tablet Take 1 tablet by mouth Every 8 (Eight) Hours As Needed for Anxiety or Seizures (panic attacks). 30 tablet 0   • losartan (Cozaar) 25 MG tablet Take 1 tablet by mouth Daily. 90 tablet 1   • metoprolol succinate XL (TOPROL-XL) 25 MG 24 hr tablet Take 1 tablet by mouth once daily 90 tablet 0   • Multiple Vitamins-Minerals (MULTIVITAMIN ADULT PO) Take 1 tablet by mouth Daily.     • nitroglycerin (NITROSTAT) 0.4 MG SL tablet Place 0.4 mg under the tongue.     • omeprazole (priLOSEC) 20 MG capsule Take 1 capsule by mouth Daily. 90 capsule 1   • triamcinolone (KENALOG) 0.1 % cream Apply  topically to the appropriate area as directed 2 (Two) Times a Day. 60 g 0   • [DISCONTINUED] atorvastatin (LIPITOR) 80 MG tablet Take 1 tablet by mouth Every Evening. 90 tablet 1     No facility-administered encounter medications on file as of 2/4/2021.        Reviewed use of high risk medication in the elderly: yes  Reviewed for potential of harmful drug interactions in the elderly: yes    Follow Up:  Return in about 6 months (around 8/4/2021) for Next scheduled follow up, Or sooner as needed With Labs prior to appointment.     An After Visit Summary and PPPS with all of these plans were given to the patient.       .

## 2021-02-04 NOTE — PROGRESS NOTES
Chief Complaint   Patient presents with   • Medicare Wellness-subsequent     sub medicare wellness    • Hyperlipidemia     6 month f/u on labs    • Hypertension     Subjective   Jaden Faith is a 70 y.o. male who presents to the office for follow-up and review of labs.  He has impaired glucose tolerance, and monitors his dietary intake of sugar and carbohydrates.  He has hypertension and his blood pressure has been controlled.  He has hyperlipidemia and takes Lipitor daily.  He has GERD and takes omeprazole daily.  He takes fluoxetine for treatment of anxiety and depression.  His depression is in full remission.  He has panic attacks and has lorazepam to use as needed.  He has not taken a dose of this in several years.  He was diagnosed with prostate cancer in 2016.  He had a prostatectomy in 2017.  He continues to follow with urology.  His urologist monitors his PSA levels.  He has mild peripheral vascular disease.  He also has coronary artery disease with stent placement.  He occasionally has anginal pain in his chest.  These have been stable, and he continues to follow with cardiology.  He takes aspirin and Plavix as part of his medication regimen for management of these.    He has seizure disorder and takes Keppra.  He had a seizure episode in December, and was seen in the emergency room.  His Keppra level was low.  I increased his Keppra dose when I saw him for an ER follow-up on 12/7/2020.  He has not had any further seizure events.  He continues to follow with neurology.    He complains of several skin lesions.  He has 1 on the right side of his forehead.  He has one on his chest and 1 on his back.  These have been getting darker in color and increasing in size.  He is wanting to see someone to have these removed.    The following portions of the patient's history were reviewed and updated as appropriate: allergies, current medications, past family history, past medical history, past social  "history, past surgical history and problem list.    Review of Systems   Constitutional: Negative for chills, fatigue and fever.   HENT: Negative for congestion, sneezing, sore throat and trouble swallowing.    Eyes: Negative for visual disturbance.   Respiratory: Negative for cough, chest tightness, shortness of breath and wheezing.    Cardiovascular: Negative for chest pain, palpitations and leg swelling.   Gastrointestinal: Negative for abdominal pain, constipation, diarrhea, nausea and vomiting.   Genitourinary: Negative for dysuria, frequency and urgency.   Musculoskeletal: Negative for neck pain.   Neurological: Negative for dizziness, weakness and headaches.   Psychiatric/Behavioral:        Patient denies any feelings of depression and has not felt down, hopeless or lost interest in any activities.   All other systems reviewed and are negative.  Review of systems has been reviewed and validated on 02/04/2021and updated with any changes.      Objective   Vitals:    02/04/21 0938   BP: 124/80   Pulse: 73   Temp: 97.7 °F (36.5 °C)   TempSrc: Oral   SpO2: 99%   Weight: 101 kg (223 lb)   Height: 170.2 cm (67\")   PainSc: 0-No pain   PainLoc: Generalized     Body mass index is 34.93 kg/m².    Physical Exam   Constitutional: He is oriented to person, place, and time. He appears well-developed. No distress.   HENT:   Head: Normocephalic and atraumatic.   Nose: Nose normal.   Eyes: Pupils are equal, round, and reactive to light. Conjunctivae are normal. No scleral icterus.   Neck: Normal range of motion. Neck supple.   Cardiovascular: Normal rate, regular rhythm and normal heart sounds. Exam reveals no gallop and no friction rub.   No murmur heard.  Pulmonary/Chest: Effort normal and breath sounds normal. No respiratory distress. He has no wheezes. He has no rales.   Musculoskeletal: Normal range of motion.   Lymphadenopathy:     He has no cervical adenopathy.   Neurological: He is alert and oriented to person, place, " and time. No cranial nerve deficit.   Skin: Skin is warm and dry. No rash noted.   Psychiatric: His behavior is normal. Judgment and thought content normal.   Nursing note and vitals reviewed.      Assessment/Plan             Diagnoses and all orders for this visit:    1. Medicare annual wellness visit, subsequent (Primary)    2. Impaired glucose tolerance  -     Hemoglobin A1c; Future    3. Essential hypertension  -     CBC & Differential; Future  -     Comprehensive Metabolic Panel; Future  -     T4, Free; Future  -     TSH; Future  -     Urinalysis With Culture If Indicated -; Future    4. Mixed hyperlipidemia  -     Lipid Panel; Future  -     atorvastatin (LIPITOR) 80 MG tablet; Take 1 tablet by mouth Every Evening.  Dispense: 90 tablet; Refill: 1    5. Peripheral vascular disease (CMS/[facility])    6. Atherosclerotic heart disease of native coronary artery with other forms of angina pectoris (CMS/[facility])    7. Gastroesophageal reflux disease without esophagitis    8. Prostate cancer (CMS/[facility])    9. Major depression, recurrent, full remission (CMS/[facility])    10. Generalized anxiety disorder    11. Seizures (CMS/[facility])  -     Levetiracetam Level (Keppra); Future    12. Skin lesion  Comments:  face, chest, back  Orders:  -     Ambulatory Referral to Dermatology    13. Drug therapy  -     Levetiracetam Level (Keppra); Future         Labs are reviewed with patient.  Patient's glucose is elevated at 123.  His hemoglobin A1c is 6.40.  Patient will continue to watch diet to help maintain control of the glucose.  Patient understands that there is an increased risk of developing diabetes in the future.  His LDL is 46.  He will continue with Lipitor for treatment of hyperlipidemia.  His blood pressure is controlled, and he will continue with his current blood pressure medication.  He will continue with fluoxetine for treatment of anxiety and depression.  He will also continue with lorazepam as needed, although he has not taken this in  several years.  He will continue with omeprazole for treatment of GERD.  He will follow up with urology as scheduled for surveillance of the prostate cancer.  He will follow up with cardiology for ongoing management of the coronary artery disease and peripheral vascular disease.  He will follow up with neurology as scheduled for the seizure disorder.  His Keppra level in December was decreased at 8.  His current level, after increasing the dose, is 9.7.  He will continue with the current dose of Keppra unless otherwise instructed by his neurologist.    I will refer him to dermatology for evaluation and removal of the skin lesions.  These have a suspicious appearance, especially since they have recently changed in size and color.    Patient understands the risks associated with this controlled medication, including tolerance and addiction.  Patient also agrees to only obtain this medication from me, and not from a another provider, unless that provider is covering for me in my absence.  Patient also agrees to be compliant in dosing, and not self adjust the dose of medication.  A signed controlled substance agreement is on file, and the patient has received a controlled substance education sheet at this a previous visit.  The patient has also signed a consent for treatment with a controlled substance as per [facility] policy. ALONDRA was obtained.    Patient's Body mass index is 34.93 kg/m². BMI is above normal parameters. Recommendations include: educational material.    PHQ-2/PHQ-9 Depression Screening 2/4/2021   Little interest or pleasure in doing things 0   Feeling down, depressed, or hopeless 0   Trouble falling or staying asleep, or sleeping too much -   Feeling tired or having little energy -   Poor appetite or overeating -   Feeling bad about yourself - or that you are a failure or have let yourself or your family down -   Trouble concentrating on things, such as reading the newspaper or watching  television -   Moving or speaking so slowly that other people could have noticed. Or the opposite - being so fidgety or restless that you have been moving around a lot more than usual -   Thoughts that you would be better off dead, or of hurting yourself in some way -   Total Score 0   If you checked off any problems, how difficult have these problems made it for you to do your work, take care of things at home, or get along with other people? -       Lab on 01/26/2021   Component Date Value Ref Range Status   • Glucose 01/26/2021 123* 70 - 99 mg/dL Final   • BUN 01/26/2021 22  7 - 23 mg/dL Final   • Creatinine 01/26/2021 0.87  0.70 - 1.30 mg/dL Final   • Sodium 01/26/2021 138  137 - 145 mmol/L Final   • Potassium 01/26/2021 4.2  3.4 - 5.0 mmol/L Final   • Chloride 01/26/2021 104  101 - 112 mmol/L Final   • CO2 01/26/2021 28.0  22.0 - 30.0 mmol/L Final   • Calcium 01/26/2021 9.1  8.4 - 10.2 mg/dL Final   • Total Protein 01/26/2021 7.4  6.3 - 8.6 g/dL Final   • Albumin 01/26/2021 4.10  3.50 - 5.00 g/dL Final   • ALT (SGPT) 01/26/2021 33  <=50 U/L Final   • AST (SGOT) 01/26/2021 34  17 - 59 U/L Final   • Alkaline Phosphatase 01/26/2021 94  38 - 126 U/L Final   • Total Bilirubin 01/26/2021 0.9  0.2 - 1.3 mg/dL Final   • eGFR Non  Amer 01/26/2021 87  49 - 113 mL/min/1.73 Final   • Globulin 01/26/2021 3.3  2.3 - 3.5 gm/dL Final   • A/G Ratio 01/26/2021 1.2  1.1 - 1.8 g/dL Final   • BUN/Creatinine Ratio 01/26/2021 25.3* 7.0 - 25.0 Final   • Anion Gap 01/26/2021 6.0  5.0 - 15.0 mmol/L Final   • Free T4 01/26/2021 1.25  0.93 - 1.70 ng/dL Final   • TSH 01/26/2021 1.890  0.270 - 4.200 uIU/mL Final   • Color, UA 01/26/2021 Yellow  Yellow, Straw Final   • Appearance, UA 01/26/2021 Clear  Clear Final   • pH, UA 01/26/2021 6.0  5.5 - 8.0 Final   • Specific Gravity, UA 01/26/2021 >=1.030  1.005 - 1.030 Final   • Glucose, UA 01/26/2021 Negative  Negative Final   • Ketones, UA 01/26/2021 Negative  Negative Final   •  Bilirubin, UA 01/26/2021 Negative  Negative Final   • Blood, UA 01/26/2021 Trace* Negative Final   • Protein, UA 01/26/2021 Negative  Negative Final   • Leuk Esterase, UA 01/26/2021 Negative  Negative Final   • Nitrite, UA 01/26/2021 Negative  Negative Final   • Urobilinogen, UA 01/26/2021 1.0 E.U./dL  0.2 - 1.0 E.U./dL Final   • Hemoglobin A1C 01/26/2021 6.40* 4.80 - 5.60 % Final   • LDL Cholesterol  01/26/2021 46  0 - 100 mg/dL Final   • Levetiracetam 01/26/2021 9.7* 10.0 - 40.0 ug/mL Final    This test was developed and its performance characteristics  determined by [facility]. It has not been cleared or approved  by the Food and Drug Administration.   • WBC 01/26/2021 8.22  3.40 - 10.80 10*3/mm3 Final   • RBC 01/26/2021 5.85* 4.14 - 5.80 10*6/mm3 Final   • Hemoglobin 01/26/2021 16.9  13.0 - 17.7 g/dL Final   • Hematocrit 01/26/2021 51.8* 37.5 - 51.0 % Final   • MCV 01/26/2021 88.5  79.0 - 97.0 fL Final   • MCH 01/26/2021 28.9  26.6 - 33.0 pg Final   • MCHC 01/26/2021 32.6  31.5 - 35.7 g/dL Final   • RDW 01/26/2021 14.6  12.3 - 15.4 % Final   • RDW-SD 01/26/2021 46.9  37.0 - 54.0 fl Final   • MPV 01/26/2021 11.7  6.0 - 12.0 fL Final   • Platelets 01/26/2021 199  140 - 450 10*3/mm3 Final   • RBC, UA 01/26/2021 3-5* None Seen /HPF Final   • WBC, UA 01/26/2021 3-5* None Seen /HPF Final   • Bacteria, UA 01/26/2021 Trace* None Seen /HPF Final   • Squamous Epithelial Cells, UA 01/26/2021 0-2  None Seen, 0-2 /HPF Final   • Hyaline Casts, UA 01/26/2021 None Seen  None Seen /LPF Final   • Mucus, UA 01/26/2021 Moderate/2+* None Seen, Trace /HPF Final   • Methodology 01/26/2021 Manual Light Microscopy   Final   • Neutrophil % 01/26/2021 69.0  42.7 - 76.0 % Final   • Lymphocyte % 01/26/2021 19.0* 19.6 - 45.3 % Final   • Monocyte % 01/26/2021 8.0  5.0 - 12.0 % Final   • Eosinophil % 01/26/2021 3.0  0.3 - 6.2 % Final   • Basophil % 01/26/2021 1.0  0.0 - 1.5 % Final   • Neutrophils Absolute 01/26/2021 5.67  1.70 - 7.00 10*3/mm3  Final   • Lymphocytes Absolute 01/26/2021 1.56  0.70 - 3.10 10*3/mm3 Final   • Monocytes Absolute 01/26/2021 0.66  0.10 - 0.90 10*3/mm3 Final   • Eosinophils Absolute 01/26/2021 0.25  0.00 - 0.40 10*3/mm3 Final   • Basophils Absolute 01/26/2021 0.08  0.00 - 0.20 10*3/mm3 Final   • RBC Morphology 01/26/2021 Normal  Normal Final   • WBC Morphology 01/26/2021 Normal  Normal Final   • Platelet Estimate 01/26/2021 Adequate  Normal Final   ]       .

## 2021-03-06 DIAGNOSIS — I10 ESSENTIAL HYPERTENSION: Chronic | ICD-10-CM

## 2021-03-08 RX ORDER — LOSARTAN POTASSIUM 25 MG/1
TABLET ORAL
Qty: 90 TABLET | Refills: 1 | Status: SHIPPED | OUTPATIENT
Start: 2021-03-08 | End: 2021-06-25

## 2021-03-23 ENCOUNTER — BULK ORDERING (OUTPATIENT)
Dept: CASE MANAGEMENT | Facility: OTHER | Age: 71
End: 2021-03-23

## 2021-03-23 DIAGNOSIS — Z23 IMMUNIZATION DUE: ICD-10-CM

## 2021-04-06 DIAGNOSIS — I10 ESSENTIAL HYPERTENSION: Chronic | ICD-10-CM

## 2021-04-07 RX ORDER — METOPROLOL SUCCINATE 25 MG/1
TABLET, EXTENDED RELEASE ORAL
Qty: 90 TABLET | Refills: 1 | Status: SHIPPED | OUTPATIENT
Start: 2021-04-07 | End: 2021-10-01

## 2021-04-19 DIAGNOSIS — K21.9 GASTROESOPHAGEAL REFLUX DISEASE WITHOUT ESOPHAGITIS: Chronic | ICD-10-CM

## 2021-04-19 RX ORDER — OMEPRAZOLE 20 MG/1
CAPSULE, DELAYED RELEASE ORAL
Qty: 90 CAPSULE | Refills: 1 | Status: SHIPPED | OUTPATIENT
Start: 2021-04-19 | End: 2021-10-25

## 2021-05-25 ENCOUNTER — OFFICE VISIT (OUTPATIENT)
Dept: FAMILY MEDICINE CLINIC | Facility: CLINIC | Age: 71
End: 2021-05-25

## 2021-05-25 VITALS
RESPIRATION RATE: 16 BRPM | HEIGHT: 67 IN | SYSTOLIC BLOOD PRESSURE: 120 MMHG | HEART RATE: 74 BPM | BODY MASS INDEX: 33.79 KG/M2 | DIASTOLIC BLOOD PRESSURE: 78 MMHG | OXYGEN SATURATION: 99 % | WEIGHT: 215.3 LBS

## 2021-05-25 DIAGNOSIS — J02.9 PHARYNGITIS, UNSPECIFIED ETIOLOGY: Primary | ICD-10-CM

## 2021-05-25 DIAGNOSIS — R21 RASH AND NONSPECIFIC SKIN ERUPTION: ICD-10-CM

## 2021-05-25 PROBLEM — K80.20 CALCULUS OF GALLBLADDER WITHOUT CHOLECYSTITIS WITHOUT OBSTRUCTION: Status: ACTIVE | Noted: 2021-04-28

## 2021-05-25 PROCEDURE — 99213 OFFICE O/P EST LOW 20 MIN: CPT | Performed by: NURSE PRACTITIONER

## 2021-05-25 RX ORDER — AMOXICILLIN AND CLAVULANATE POTASSIUM 875; 125 MG/1; MG/1
1 TABLET, FILM COATED ORAL 2 TIMES DAILY
Qty: 14 TABLET | Refills: 0 | Status: SHIPPED | OUTPATIENT
Start: 2021-05-25 | End: 2021-08-06

## 2021-05-25 RX ORDER — DICYCLOMINE HCL 20 MG
TABLET ORAL
COMMUNITY
Start: 2021-04-22

## 2021-05-25 RX ORDER — PREDNISONE 20 MG/1
20 TABLET ORAL 2 TIMES DAILY
Qty: 14 TABLET | Refills: 0 | Status: SHIPPED | OUTPATIENT
Start: 2021-05-25 | End: 2021-06-01

## 2021-05-25 NOTE — PROGRESS NOTES
Subjective   Jaden Faith is a 71 y.o. male.       Chief Complaint   Patient presents with   • Rash        History of Present Illness       Primary care patient of Dr Ruiz Li. Presents for sudden onset of a confluent red rash of entire torso sparing the  Sternum and the scapula. There is no itch, no burn, no lesion and denies sun exposure without a shirt recently. Is not palpable, is flat. No new foods, medicines or lotions, no new detergents. No other symptoms other than a sore throat, woke up with both the rash and the sore throat. No fevers or chills.   Other complaints today: none.    Parts of most recent relevant visit HPI, ROS  and PE may be carried forward and all are updated as appropriate for current situation.    Past Surgical History:   Procedure Laterality Date   • APPENDECTOMY     • COLONOSCOPY  06/16/2016    diverticulosis-Dr. Medrano   • CORONARY STENT PLACEMENT  03/20/2019    Distal RCA & Proximal RCA, Dr. Carla Odell [place]   • ENDOSCOPY  12/14/2009    Dr. Medrano-gastritis   • FEMUR FRACTURE SURGERY      pins out   • INJECTION OF MEDICATION  05/22/2014    METHYLPREDNISONE, 80 MG   • INJECTION OF MEDICATION  12/26/2014    ROCEPHIN   • PROSTATECTOMY        Social History     Socioeconomic History   • Marital status:      Spouse name: Not on file   • Number of children: Not on file   • Years of education: Not on file   • Highest education level: Not on file   Tobacco Use   • Smoking status: Never Smoker   • Smokeless tobacco: Never Used   Substance and Sexual Activity   • Alcohol use: No   • Drug use: No   • Sexual activity: Defer      The following portions of the patient's history were reviewed and updated as appropriate: He  has a past medical history of Abdominal pain, Abscess of dorsum of hand, Acute pharyngitis, Backache, Cancer (CMS/HCC), Carotid artery stenosis, Chest pain, Colitis, Diarrhea, Essential hypertension, External hordeolum, Furuncle of abdominal wall, Ganglion  cyst, Gastroesophageal reflux disease without esophagitis, History of vasectomy, Hypokalemia, Impaired glucose tolerance, Leukocytosis, Male erectile dysfunction, unspecified, Medial epicondylitis, Mixed hyperlipidemia, Nausea, Non-seasonal allergic rhinitis due to pollen, Otitis externa, Peripheral vascular disease (CMS/[facility]), Prostate cancer (CMS/[facility]) (5/18/2017), Pruritus ani, Rectal hemorrhage, Syncope, Tinnitus, Ulcer of esophagus, Upper respiratory infection, Vomiting, and Zinc deficiency..    Review of Systems   Constitutional: Negative.  Negative for activity change, appetite change, chills, fever and unexpected weight loss.   HENT: Negative.  Negative for ear pain, postnasal drip, rhinorrhea, sinus pressure, sneezing, sore throat, swollen glands, trouble swallowing and voice change.    Eyes: Negative.  Negative for discharge, redness, itching and visual disturbance.   Respiratory: Negative.  Negative for cough, shortness of breath and wheezing.    Cardiovascular: Negative.  Negative for chest pain and leg swelling.   Gastrointestinal: Negative.    Endocrine: Negative.  Negative for polydipsia, polyphagia and polyuria.   Genitourinary: Negative.  Negative for dysuria.   Musculoskeletal: Negative.  Negative for arthralgias.   Skin: Negative.    Allergic/Immunologic: Positive for environmental allergies.   Neurological: Negative.    Hematological: Negative.    Psychiatric/Behavioral: Negative.  Negative for behavioral problems, dysphoric mood, sleep disturbance, suicidal ideas and depressed mood. The patient is not nervous/anxious.    All other systems reviewed and are negative.    PHQ-9 Depression Screening  Little interest or pleasure in doing things?     Feeling down, depressed, or hopeless?     Trouble falling or staying asleep, or sleeping too much?     Feeling tired or having little energy?     Poor appetite or overeating?     Feeling bad about yourself - or that you are a failure or have let yourself or  "your family down?     Trouble concentrating on things, such as reading the newspaper or watching television?     Moving or speaking so slowly that other people could have noticed? Or the opposite - being so fidgety or restless that you have been moving around a lot more than usual?     Thoughts that you would be better off dead, or of hurting yourself in some way?     PHQ-9 Total Score     If you checked off any problems, how difficult have these problems made it for you to do your work, take care of things at home, or get along with other people?        Objective    Vitals:    05/25/21 1049   BP: 120/78   BP Location: Left arm   Patient Position: Sitting   Cuff Size: Adult   Pulse: 74   Resp: 16   SpO2: 99%   Weight: 97.7 kg (215 lb 4.8 oz)   Height: 170.2 cm (67\")   PainSc: 0-No pain       Physical Exam  Vitals and nursing note reviewed.   Constitutional:       General: He is not in acute distress.     Appearance: Normal appearance. He is well-developed. He is not ill-appearing or diaphoretic.   HENT:      Head: Normocephalic and atraumatic.   Eyes:      General: No scleral icterus.        Right eye: No discharge.         Left eye: No discharge.      Conjunctiva/sclera: Conjunctivae normal.      Pupils: Pupils are equal, round, and reactive to light.   Neck:      Thyroid: No thyromegaly.      Vascular: No carotid bruit or JVD.      Trachea: No tracheal deviation.   Cardiovascular:      Rate and Rhythm: Normal rate and regular rhythm.      Pulses: Normal pulses.      Heart sounds: Normal heart sounds. No murmur heard.   No friction rub. No gallop.    Pulmonary:      Effort: Pulmonary effort is normal. No respiratory distress.      Breath sounds: Normal breath sounds. No stridor. No wheezing, rhonchi or rales.   Chest:      Chest wall: No tenderness.   Abdominal:      General: Bowel sounds are normal.      Palpations: Abdomen is soft.   Musculoskeletal:         General: No tenderness or deformity. Normal range of " motion.      Cervical back: Normal range of motion and neck supple. No rigidity or tenderness.      Right lower leg: No edema.      Left lower leg: No edema.   Lymphadenopathy:      Cervical: No cervical adenopathy.   Skin:     General: Skin is warm and dry.      Capillary Refill: Capillary refill takes 2 to 3 seconds.      Coloration: Skin is not jaundiced or pale.      Findings: No bruising, erythema, lesion or rash.   Neurological:      General: No focal deficit present.      Mental Status: He is alert and oriented to person, place, and time. Mental status is at baseline.      Motor: No weakness.      Gait: Gait normal.   Psychiatric:         Mood and Affect: Mood normal.         Behavior: Behavior normal.         Thought Content: Thought content normal.         Judgment: Judgment normal.     presumed strep throat and strep rash, prescribed augmentin and prednisone. Follow up with me or Dr Li if not better in 3-5 days or if worsens.    Assessment/Plan   Diagnoses and all orders for this visit:    1. Pharyngitis, unspecified etiology (Primary)  -     amoxicillin-clavulanate (Augmentin) 875-125 MG per tablet; Take 1 tablet by mouth 2 (Two) Times a Day.  Dispense: 14 tablet; Refill: 0  -     predniSONE (DELTASONE) 20 MG tablet; Take 1 tablet by mouth 2 (Two) Times a Day for 7 days.  Dispense: 14 tablet; Refill: 0    2. Rash and nonspecific skin eruption  -     amoxicillin-clavulanate (Augmentin) 875-125 MG per tablet; Take 1 tablet by mouth 2 (Two) Times a Day.  Dispense: 14 tablet; Refill: 0  -     predniSONE (DELTASONE) 20 MG tablet; Take 1 tablet by mouth 2 (Two) Times a Day for 7 days.  Dispense: 14 tablet; Refill: 0        Return in about 3 days (around 5/28/2021).             This document has been electronically signed by JEROME House on May 25, 2021 11:42 CDT

## 2021-06-24 DIAGNOSIS — I10 ESSENTIAL HYPERTENSION: Chronic | ICD-10-CM

## 2021-06-25 RX ORDER — LOSARTAN POTASSIUM 25 MG/1
TABLET ORAL
Qty: 30 TABLET | Refills: 5 | Status: SHIPPED | OUTPATIENT
Start: 2021-06-25 | End: 2022-02-22 | Stop reason: SDUPTHER

## 2021-06-29 DIAGNOSIS — R56.9 SEIZURES (HCC): Chronic | ICD-10-CM

## 2021-07-01 ENCOUNTER — TELEPHONE (OUTPATIENT)
Dept: FAMILY MEDICINE CLINIC | Facility: CLINIC | Age: 71
End: 2021-07-01

## 2021-07-01 RX ORDER — LEVETIRACETAM 500 MG/1
TABLET ORAL
Qty: 270 TABLET | Refills: 1 | Status: SHIPPED | OUTPATIENT
Start: 2021-07-01 | End: 2021-12-07 | Stop reason: SDUPTHER

## 2021-07-30 ENCOUNTER — LAB (OUTPATIENT)
Dept: LAB | Facility: OTHER | Age: 71
End: 2021-07-30

## 2021-07-30 DIAGNOSIS — E78.2 MIXED HYPERLIPIDEMIA: Chronic | ICD-10-CM

## 2021-07-30 DIAGNOSIS — Z79.899 DRUG THERAPY: ICD-10-CM

## 2021-07-30 DIAGNOSIS — I10 ESSENTIAL HYPERTENSION: ICD-10-CM

## 2021-07-30 DIAGNOSIS — R56.9 SEIZURES (HCC): Chronic | ICD-10-CM

## 2021-07-30 DIAGNOSIS — R73.02 IMPAIRED GLUCOSE TOLERANCE: Chronic | ICD-10-CM

## 2021-07-30 LAB
ALBUMIN SERPL-MCNC: 4.1 G/DL (ref 3.5–5)
ALBUMIN/GLOB SERPL: 1.3 G/DL (ref 1.1–1.8)
ALP SERPL-CCNC: 98 U/L (ref 38–126)
ALT SERPL W P-5'-P-CCNC: 24 U/L
ANION GAP SERPL CALCULATED.3IONS-SCNC: 8 MMOL/L (ref 5–15)
AST SERPL-CCNC: 26 U/L (ref 17–59)
BACTERIA UR QL AUTO: ABNORMAL /HPF
BASOPHILS # BLD AUTO: 0.05 10*3/MM3 (ref 0–0.2)
BASOPHILS NFR BLD AUTO: 0.7 % (ref 0–1.5)
BILIRUB SERPL-MCNC: 1.4 MG/DL (ref 0.2–1.3)
BILIRUB UR QL STRIP: NEGATIVE
BUN SERPL-MCNC: 23 MG/DL (ref 7–23)
BUN/CREAT SERPL: 30.3 (ref 7–25)
CALCIUM SPEC-SCNC: 9.1 MG/DL (ref 8.4–10.2)
CHLORIDE SERPL-SCNC: 108 MMOL/L (ref 101–112)
CHOLEST SERPL-MCNC: 88 MG/DL (ref 150–200)
CLARITY UR: CLEAR
CO2 SERPL-SCNC: 25 MMOL/L (ref 22–30)
COLOR UR: YELLOW
CREAT SERPL-MCNC: 0.76 MG/DL (ref 0.7–1.3)
DEPRECATED RDW RBC AUTO: 48 FL (ref 37–54)
EOSINOPHIL # BLD AUTO: 0.38 10*3/MM3 (ref 0–0.4)
EOSINOPHIL NFR BLD AUTO: 5.1 % (ref 0.3–6.2)
ERYTHROCYTE [DISTWIDTH] IN BLOOD BY AUTOMATED COUNT: 14.5 % (ref 12.3–15.4)
GFR SERPL CREATININE-BSD FRML MDRD: 101 ML/MIN/1.73 (ref 42–98)
GLOBULIN UR ELPH-MCNC: 3.1 GM/DL (ref 2.3–3.5)
GLUCOSE SERPL-MCNC: 133 MG/DL (ref 70–99)
GLUCOSE UR STRIP-MCNC: NEGATIVE MG/DL
HBA1C MFR BLD: 6.6 % (ref 4.8–5.6)
HCT VFR BLD AUTO: 47.7 % (ref 37.5–51)
HDLC SERPL-MCNC: 37 MG/DL (ref 40–59)
HGB BLD-MCNC: 16.3 G/DL (ref 13–17.7)
HGB UR QL STRIP.AUTO: ABNORMAL
HYALINE CASTS UR QL AUTO: ABNORMAL /LPF
KETONES UR QL STRIP: NEGATIVE
LDLC SERPL CALC-MCNC: 33 MG/DL
LDLC/HDLC SERPL: 0.89 {RATIO} (ref 0–3.55)
LEUKOCYTE ESTERASE UR QL STRIP.AUTO: NEGATIVE
LYMPHOCYTES # BLD AUTO: 1.88 10*3/MM3 (ref 0.7–3.1)
LYMPHOCYTES NFR BLD AUTO: 25.3 % (ref 19.6–45.3)
MCH RBC QN AUTO: 31 PG (ref 26.6–33)
MCHC RBC AUTO-ENTMCNC: 34.2 G/DL (ref 31.5–35.7)
MCV RBC AUTO: 90.7 FL (ref 79–97)
MONOCYTES # BLD AUTO: 0.56 10*3/MM3 (ref 0.1–0.9)
MONOCYTES NFR BLD AUTO: 7.5 % (ref 5–12)
MUCOUS THREADS URNS QL MICRO: ABNORMAL /HPF
NEUTROPHILS NFR BLD AUTO: 4.56 10*3/MM3 (ref 1.7–7)
NEUTROPHILS NFR BLD AUTO: 61.4 % (ref 42.7–76)
NITRITE UR QL STRIP: NEGATIVE
PH UR STRIP.AUTO: <=5 [PH] (ref 5.5–8)
PLATELET # BLD AUTO: 186 10*3/MM3 (ref 140–450)
PMV BLD AUTO: 11.5 FL (ref 6–12)
POTASSIUM SERPL-SCNC: 4.3 MMOL/L (ref 3.4–5)
PROT SERPL-MCNC: 7.2 G/DL (ref 6.3–8.6)
PROT UR QL STRIP: NEGATIVE
RBC # BLD AUTO: 5.26 10*6/MM3 (ref 4.14–5.8)
RBC # UR: ABNORMAL /HPF
REF LAB TEST METHOD: ABNORMAL
SODIUM SERPL-SCNC: 141 MMOL/L (ref 137–145)
SP GR UR STRIP: >=1.03 (ref 1–1.03)
SQUAMOUS #/AREA URNS HPF: ABNORMAL /HPF
T4 FREE SERPL-MCNC: 1.08 NG/DL (ref 0.93–1.7)
TRIGL SERPL-MCNC: 91 MG/DL
TSH SERPL DL<=0.05 MIU/L-ACNC: 1.75 UIU/ML (ref 0.27–4.2)
UROBILINOGEN UR QL STRIP: ABNORMAL
VLDLC SERPL-MCNC: 18 MG/DL (ref 5–40)
WBC # BLD AUTO: 7.43 10*3/MM3 (ref 3.4–10.8)
WBC UR QL AUTO: ABNORMAL /HPF

## 2021-07-30 PROCEDURE — 80053 COMPREHEN METABOLIC PANEL: CPT | Performed by: INTERNAL MEDICINE

## 2021-07-30 PROCEDURE — 36415 COLL VENOUS BLD VENIPUNCTURE: CPT | Performed by: INTERNAL MEDICINE

## 2021-07-30 PROCEDURE — 81001 URINALYSIS AUTO W/SCOPE: CPT | Performed by: INTERNAL MEDICINE

## 2021-07-30 PROCEDURE — 80061 LIPID PANEL: CPT | Performed by: INTERNAL MEDICINE

## 2021-07-30 PROCEDURE — 84443 ASSAY THYROID STIM HORMONE: CPT | Performed by: INTERNAL MEDICINE

## 2021-07-30 PROCEDURE — 83036 HEMOGLOBIN GLYCOSYLATED A1C: CPT | Performed by: INTERNAL MEDICINE

## 2021-07-30 PROCEDURE — 80177 DRUG SCRN QUAN LEVETIRACETAM: CPT | Performed by: INTERNAL MEDICINE

## 2021-07-30 PROCEDURE — 85025 COMPLETE CBC W/AUTO DIFF WBC: CPT | Performed by: INTERNAL MEDICINE

## 2021-07-30 PROCEDURE — 84439 ASSAY OF FREE THYROXINE: CPT | Performed by: INTERNAL MEDICINE

## 2021-08-02 LAB — LEVETIRACETAM SERPL-MCNC: 10.3 UG/ML (ref 10–40)
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
## 2021-08-06 ENCOUNTER — OFFICE VISIT (OUTPATIENT)
Dept: FAMILY MEDICINE CLINIC | Facility: CLINIC | Age: 71
End: 2021-08-06

## 2021-08-06 VITALS
WEIGHT: 214 LBS | BODY MASS INDEX: 33.59 KG/M2 | HEART RATE: 71 BPM | DIASTOLIC BLOOD PRESSURE: 80 MMHG | TEMPERATURE: 97.3 F | SYSTOLIC BLOOD PRESSURE: 124 MMHG | HEIGHT: 67 IN | OXYGEN SATURATION: 96 %

## 2021-08-06 DIAGNOSIS — I25.118 ATHEROSCLEROTIC HEART DISEASE OF NATIVE CORONARY ARTERY WITH OTHER FORMS OF ANGINA PECTORIS (HCC): Chronic | ICD-10-CM

## 2021-08-06 DIAGNOSIS — R73.02 IMPAIRED GLUCOSE TOLERANCE: Primary | Chronic | ICD-10-CM

## 2021-08-06 DIAGNOSIS — I10 ESSENTIAL HYPERTENSION: Chronic | ICD-10-CM

## 2021-08-06 DIAGNOSIS — S49.91XA INJURY OF RIGHT SHOULDER, INITIAL ENCOUNTER: ICD-10-CM

## 2021-08-06 DIAGNOSIS — R56.9 SEIZURES (HCC): Chronic | ICD-10-CM

## 2021-08-06 DIAGNOSIS — E78.2 MIXED HYPERLIPIDEMIA: Chronic | ICD-10-CM

## 2021-08-06 DIAGNOSIS — F41.1 GENERALIZED ANXIETY DISORDER: Chronic | ICD-10-CM

## 2021-08-06 DIAGNOSIS — C61 PROSTATE CANCER (HCC): Chronic | ICD-10-CM

## 2021-08-06 DIAGNOSIS — F33.42 MAJOR DEPRESSION, RECURRENT, FULL REMISSION (HCC): Chronic | ICD-10-CM

## 2021-08-06 DIAGNOSIS — I73.9 PERIPHERAL VASCULAR DISEASE (HCC): Chronic | ICD-10-CM

## 2021-08-06 DIAGNOSIS — K21.9 GASTROESOPHAGEAL REFLUX DISEASE WITHOUT ESOPHAGITIS: Chronic | ICD-10-CM

## 2021-08-06 PROCEDURE — 99214 OFFICE O/P EST MOD 30 MIN: CPT | Performed by: INTERNAL MEDICINE

## 2021-08-06 RX ORDER — ATORVASTATIN CALCIUM 80 MG/1
80 TABLET, FILM COATED ORAL EVERY EVENING
Qty: 90 TABLET | Refills: 1 | Status: SHIPPED | OUTPATIENT
Start: 2021-08-06 | End: 2021-12-07 | Stop reason: SDUPTHER

## 2021-08-06 RX ORDER — FLUOXETINE HYDROCHLORIDE 40 MG/1
40 CAPSULE ORAL DAILY
Qty: 90 CAPSULE | Refills: 1 | Status: SHIPPED | OUTPATIENT
Start: 2021-08-06 | End: 2021-12-07 | Stop reason: SDUPTHER

## 2021-09-30 DIAGNOSIS — I10 ESSENTIAL HYPERTENSION: Chronic | ICD-10-CM

## 2021-10-01 RX ORDER — METOPROLOL SUCCINATE 25 MG/1
TABLET, EXTENDED RELEASE ORAL
Qty: 90 TABLET | Refills: 3 | Status: SHIPPED | OUTPATIENT
Start: 2021-10-01 | End: 2021-12-07 | Stop reason: SDUPTHER

## 2021-10-25 ENCOUNTER — TELEPHONE (OUTPATIENT)
Dept: FAMILY MEDICINE CLINIC | Facility: CLINIC | Age: 71
End: 2021-10-25

## 2021-10-25 DIAGNOSIS — K21.9 GASTROESOPHAGEAL REFLUX DISEASE WITHOUT ESOPHAGITIS: Primary | ICD-10-CM

## 2021-10-25 RX ORDER — PANTOPRAZOLE SODIUM 40 MG/1
40 TABLET, DELAYED RELEASE ORAL DAILY
Qty: 90 TABLET | Refills: 1 | Status: SHIPPED | OUTPATIENT
Start: 2021-10-25 | End: 2021-12-07 | Stop reason: SDUPTHER

## 2021-10-25 NOTE — TELEPHONE ENCOUNTER
I called the patient an advised we where sending in pantoprazole 40 mg to replace the 20 mg omeprazole to due a drug interaction with the clopidogrel.   He voiced understanding and thanked me for the call.

## 2021-12-06 ENCOUNTER — TELEPHONE (OUTPATIENT)
Dept: FAMILY MEDICINE CLINIC | Facility: CLINIC | Age: 71
End: 2021-12-06

## 2021-12-06 DIAGNOSIS — R56.9 SEIZURES (HCC): Chronic | ICD-10-CM

## 2021-12-06 DIAGNOSIS — I10 ESSENTIAL HYPERTENSION: Chronic | ICD-10-CM

## 2021-12-06 DIAGNOSIS — E78.2 MIXED HYPERLIPIDEMIA: Chronic | ICD-10-CM

## 2021-12-06 DIAGNOSIS — F41.0 PANIC ATTACKS: Chronic | ICD-10-CM

## 2021-12-06 DIAGNOSIS — I25.118 ATHEROSCLEROTIC HEART DISEASE OF NATIVE CORONARY ARTERY WITH OTHER FORMS OF ANGINA PECTORIS (HCC): Primary | ICD-10-CM

## 2021-12-06 DIAGNOSIS — F33.42 MAJOR DEPRESSION, RECURRENT, FULL REMISSION (HCC): Chronic | ICD-10-CM

## 2021-12-06 DIAGNOSIS — F41.1 GENERALIZED ANXIETY DISORDER: Chronic | ICD-10-CM

## 2021-12-06 DIAGNOSIS — K21.9 GASTROESOPHAGEAL REFLUX DISEASE WITHOUT ESOPHAGITIS: ICD-10-CM

## 2021-12-06 NOTE — TELEPHONE ENCOUNTER
Patient called and said that January 1 he will be changing to [facility] mail order [facility].

## 2021-12-07 RX ORDER — PANTOPRAZOLE SODIUM 40 MG/1
40 TABLET, DELAYED RELEASE ORAL DAILY
Qty: 90 TABLET | Refills: 1 | Status: SHIPPED | OUTPATIENT
Start: 2021-12-07 | End: 2022-05-31

## 2021-12-07 RX ORDER — METOPROLOL SUCCINATE 25 MG/1
25 TABLET, EXTENDED RELEASE ORAL DAILY
Qty: 90 TABLET | Refills: 1 | Status: SHIPPED | OUTPATIENT
Start: 2021-12-07 | End: 2022-05-31

## 2021-12-07 RX ORDER — LORAZEPAM 0.5 MG/1
0.5 TABLET ORAL EVERY 8 HOURS PRN
Qty: 20 TABLET | Refills: 0 | Status: SHIPPED | OUTPATIENT
Start: 2021-12-07 | End: 2022-08-22

## 2021-12-07 RX ORDER — CLOPIDOGREL BISULFATE 75 MG/1
75 TABLET ORAL DAILY
Qty: 90 TABLET | Refills: 1 | Status: SHIPPED | OUTPATIENT
Start: 2021-12-07 | End: 2022-08-22 | Stop reason: SDUPTHER

## 2021-12-07 RX ORDER — FLUOXETINE HYDROCHLORIDE 40 MG/1
40 CAPSULE ORAL DAILY
Qty: 90 CAPSULE | Refills: 1 | Status: SHIPPED | OUTPATIENT
Start: 2021-12-07 | End: 2022-05-09

## 2021-12-07 RX ORDER — LEVETIRACETAM 500 MG/1
TABLET ORAL
Qty: 270 TABLET | Refills: 1 | Status: SHIPPED | OUTPATIENT
Start: 2021-12-07 | End: 2022-05-31

## 2021-12-07 RX ORDER — ATORVASTATIN CALCIUM 80 MG/1
80 TABLET, FILM COATED ORAL EVERY EVENING
Qty: 90 TABLET | Refills: 1 | Status: SHIPPED | OUTPATIENT
Start: 2021-12-07 | End: 2022-05-23

## 2021-12-23 ENCOUNTER — PROCEDURE VISIT (OUTPATIENT)
Dept: FAMILY MEDICINE CLINIC | Facility: CLINIC | Age: 71
End: 2021-12-23

## 2021-12-23 VITALS
TEMPERATURE: 98.1 F | WEIGHT: 218 LBS | RESPIRATION RATE: 16 BRPM | BODY MASS INDEX: 34.21 KG/M2 | OXYGEN SATURATION: 98 % | DIASTOLIC BLOOD PRESSURE: 84 MMHG | HEIGHT: 67 IN | SYSTOLIC BLOOD PRESSURE: 162 MMHG | HEART RATE: 84 BPM

## 2021-12-23 DIAGNOSIS — M25.511 CHRONIC RIGHT SHOULDER PAIN: Primary | ICD-10-CM

## 2021-12-23 DIAGNOSIS — G89.29 CHRONIC RIGHT SHOULDER PAIN: Primary | ICD-10-CM

## 2021-12-23 DIAGNOSIS — M25.611 DECREASED RANGE OF MOTION OF RIGHT SHOULDER: ICD-10-CM

## 2021-12-23 PROCEDURE — 20610 DRAIN/INJ JOINT/BURSA W/O US: CPT | Performed by: NURSE PRACTITIONER

## 2021-12-23 RX ORDER — LIDOCAINE HYDROCHLORIDE 10 MG/ML
2 INJECTION, SOLUTION INFILTRATION; PERINEURAL
Status: COMPLETED | OUTPATIENT
Start: 2021-12-23 | End: 2021-12-23

## 2021-12-23 RX ORDER — TRIAMCINOLONE ACETONIDE 40 MG/ML
80 INJECTION, SUSPENSION INTRA-ARTICULAR; INTRAMUSCULAR
Status: COMPLETED | OUTPATIENT
Start: 2021-12-23 | End: 2021-12-23

## 2021-12-23 RX ADMIN — LIDOCAINE HYDROCHLORIDE 2 ML: 10 INJECTION, SOLUTION INFILTRATION; PERINEURAL at 13:36

## 2021-12-23 RX ADMIN — TRIAMCINOLONE ACETONIDE 80 MG: 40 INJECTION, SUSPENSION INTRA-ARTICULAR; INTRAMUSCULAR at 13:36
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
## 2022-01-10 DIAGNOSIS — F41.0 PANIC ATTACKS: Chronic | ICD-10-CM

## 2022-01-10 DIAGNOSIS — R56.9 SEIZURES: Chronic | ICD-10-CM

## 2022-01-10 RX ORDER — LORAZEPAM 0.5 MG/1
0.5 TABLET ORAL EVERY 8 HOURS PRN
Qty: 20 TABLET | Refills: 0 | OUTPATIENT
Start: 2022-01-10

## 2022-01-13 ENCOUNTER — TELEPHONE (OUTPATIENT)
Dept: FAMILY MEDICINE CLINIC | Facility: CLINIC | Age: 72
End: 2022-01-13

## 2022-01-13 NOTE — TELEPHONE ENCOUNTER
Patient called and said that he need a refill on LORazepam (ATIVAN) 0.5 MG, to [facility].

## 2022-01-13 NOTE — TELEPHONE ENCOUNTER
I advised the patient [facility] filled his script on 1/7/22. If he has not received his medication he needs to call Janette. Patient voiced understanding and thanked me for the call.

## 2022-02-01 ENCOUNTER — LAB (OUTPATIENT)
Dept: LAB | Facility: OTHER | Age: 72
End: 2022-02-01

## 2022-02-01 DIAGNOSIS — I10 ESSENTIAL HYPERTENSION: ICD-10-CM

## 2022-02-01 DIAGNOSIS — R73.02 IMPAIRED GLUCOSE TOLERANCE: Chronic | ICD-10-CM

## 2022-02-01 DIAGNOSIS — E78.2 MIXED HYPERLIPIDEMIA: Chronic | ICD-10-CM

## 2022-02-01 LAB
ALBUMIN SERPL-MCNC: 4.1 G/DL (ref 3.5–5)
ALBUMIN/GLOB SERPL: 1.4 G/DL (ref 1.1–1.8)
ALP SERPL-CCNC: 75 U/L (ref 38–126)
ALT SERPL W P-5'-P-CCNC: 37 U/L
ANION GAP SERPL CALCULATED.3IONS-SCNC: 6 MMOL/L (ref 5–15)
ARTICHOKE IGE QN: 33 MG/DL (ref 0–100)
AST SERPL-CCNC: 25 U/L (ref 17–59)
BASOPHILS # BLD AUTO: 0.05 10*3/MM3 (ref 0–0.2)
BASOPHILS NFR BLD AUTO: 0.5 % (ref 0–1.5)
BILIRUB SERPL-MCNC: 1.4 MG/DL (ref 0.2–1.3)
BILIRUB UR QL STRIP: NEGATIVE
BUN SERPL-MCNC: 31 MG/DL (ref 7–23)
BUN/CREAT SERPL: 34.1 (ref 7–25)
CALCIUM SPEC-SCNC: 9.2 MG/DL (ref 8.4–10.2)
CHLORIDE SERPL-SCNC: 105 MMOL/L (ref 101–112)
CLARITY UR: CLEAR
CO2 SERPL-SCNC: 29 MMOL/L (ref 22–30)
COLOR UR: YELLOW
CREAT SERPL-MCNC: 0.91 MG/DL (ref 0.7–1.3)
DEPRECATED RDW RBC AUTO: 47.2 FL (ref 37–54)
EOSINOPHIL # BLD AUTO: 0.18 10*3/MM3 (ref 0–0.4)
EOSINOPHIL NFR BLD AUTO: 1.8 % (ref 0.3–6.2)
ERYTHROCYTE [DISTWIDTH] IN BLOOD BY AUTOMATED COUNT: 14 % (ref 12.3–15.4)
GFR SERPL CREATININE-BSD FRML MDRD: 82 ML/MIN/1.73 (ref 42–98)
GLOBULIN UR ELPH-MCNC: 3 GM/DL (ref 2.3–3.5)
GLUCOSE SERPL-MCNC: 113 MG/DL (ref 70–99)
GLUCOSE UR STRIP-MCNC: NEGATIVE MG/DL
HBA1C MFR BLD: 6.5 % (ref 4.8–5.6)
HCT VFR BLD AUTO: 50.6 % (ref 37.5–51)
HGB BLD-MCNC: 16.3 G/DL (ref 13–17.7)
HGB UR QL STRIP.AUTO: NEGATIVE
KETONES UR QL STRIP: NEGATIVE
LEUKOCYTE ESTERASE UR QL STRIP.AUTO: NEGATIVE
LYMPHOCYTES # BLD AUTO: 2.11 10*3/MM3 (ref 0.7–3.1)
LYMPHOCYTES NFR BLD AUTO: 20.7 % (ref 19.6–45.3)
MCH RBC QN AUTO: 30.1 PG (ref 26.6–33)
MCHC RBC AUTO-ENTMCNC: 32.2 G/DL (ref 31.5–35.7)
MCV RBC AUTO: 93.4 FL (ref 79–97)
MONOCYTES # BLD AUTO: 0.76 10*3/MM3 (ref 0.1–0.9)
MONOCYTES NFR BLD AUTO: 7.4 % (ref 5–12)
NEUTROPHILS NFR BLD AUTO: 69.6 % (ref 42.7–76)
NEUTROPHILS NFR BLD AUTO: 7.11 10*3/MM3 (ref 1.7–7)
NITRITE UR QL STRIP: NEGATIVE
PH UR STRIP.AUTO: 5.5 [PH] (ref 5.5–8)
PLATELET # BLD AUTO: 193 10*3/MM3 (ref 140–450)
PMV BLD AUTO: 11.3 FL (ref 6–12)
POTASSIUM SERPL-SCNC: 4.1 MMOL/L (ref 3.4–5)
PROT SERPL-MCNC: 7.1 G/DL (ref 6.3–8.6)
PROT UR QL STRIP: NEGATIVE
RBC # BLD AUTO: 5.42 10*6/MM3 (ref 4.14–5.8)
SODIUM SERPL-SCNC: 140 MMOL/L (ref 137–145)
SP GR UR STRIP: >=1.03 (ref 1–1.03)
T4 FREE SERPL-MCNC: 1.27 NG/DL (ref 0.93–1.7)
TSH SERPL DL<=0.05 MIU/L-ACNC: 2.12 UIU/ML (ref 0.27–4.2)
UROBILINOGEN UR QL STRIP: NORMAL
WBC NRBC COR # BLD: 10.21 10*3/MM3 (ref 3.4–10.8)

## 2022-02-01 PROCEDURE — 81003 URINALYSIS AUTO W/O SCOPE: CPT | Performed by: INTERNAL MEDICINE

## 2022-02-01 PROCEDURE — 85025 COMPLETE CBC W/AUTO DIFF WBC: CPT | Performed by: INTERNAL MEDICINE

## 2022-02-01 PROCEDURE — 80053 COMPREHEN METABOLIC PANEL: CPT | Performed by: INTERNAL MEDICINE

## 2022-02-01 PROCEDURE — 84443 ASSAY THYROID STIM HORMONE: CPT | Performed by: INTERNAL MEDICINE

## 2022-02-01 PROCEDURE — 84439 ASSAY OF FREE THYROXINE: CPT | Performed by: INTERNAL MEDICINE

## 2022-02-01 PROCEDURE — 83036 HEMOGLOBIN GLYCOSYLATED A1C: CPT | Performed by: INTERNAL MEDICINE

## 2022-02-01 PROCEDURE — 36415 COLL VENOUS BLD VENIPUNCTURE: CPT | Performed by: INTERNAL MEDICINE

## 2022-02-01 PROCEDURE — 83721 ASSAY OF BLOOD LIPOPROTEIN: CPT | Performed by: INTERNAL MEDICINE

## 2022-02-21 NOTE — PROGRESS NOTES
QUICK REFERENCE INFORMATION:  The ABCs of the Annual Wellness Visit    Subsequent Medicare Wellness Visit    HEALTH RISK ASSESSMENT    1950    Recent Hospitalizations:  No hospitalization(s) within the last year.        Current Medical Providers:  Patient Care Team:  Ruiz Li MD as PCP - Garth Sahni MD as Consulting Physician (Neurology)  Gordon Lorenzo MD as Consulting Physician (Urology)  Ed Colunga MD as Surgeon (Otolaryngology)  Bharath Aguirre MD as Cardiologist (Cardiology)        Smoking Status:  Social History     Tobacco Use   Smoking Status Never Smoker   Smokeless Tobacco Never Used       Alcohol Consumption:  Social History     Substance and Sexual Activity   Alcohol Use No       Depression Screen:   PHQ-2/PHQ-9 Depression Screening 2/22/2022   Little interest or pleasure in doing things 0   Feeling down, depressed, or hopeless 0   Trouble falling or staying asleep, or sleeping too much -   Feeling tired or having little energy -   Poor appetite or overeating -   Feeling bad about yourself - or that you are a failure or have let yourself or your family down -   Trouble concentrating on things, such as reading the newspaper or watching television -   Moving or speaking so slowly that other people could have noticed. Or the opposite - being so fidgety or restless that you have been moving around a lot more than usual -   Thoughts that you would be better off dead, or of hurting yourself in some way -   Total Score 0   If you checked off any problems, how difficult have these problems made it for you to do your work, take care of things at home, or get along with other people? -       Health Habits and Functional and Cognitive Screening:  Functional & Cognitive Status 2/22/2022   Do you have difficulty preparing food and eating? No   Do you have difficulty bathing yourself, getting dressed or grooming yourself? No   Do you have difficulty using the toilet? No    Do you have difficulty moving around from place to place? No   Do you have trouble with steps or getting out of a bed or a chair? No   Current Diet Well Balanced Diet   Dental Exam Not up to date   Eye Exam Not up to date   Exercise (times per week) 4 times per week   Current Exercises Include Treadmill   Current Exercise Activities Include -   Do you need help using the phone?  No   Are you deaf or do you have serious difficulty hearing?  No   Do you need help with transportation? No   Do you need help shopping? No   Do you need help preparing meals?  No   Do you need help with housework?  No   Do you need help with laundry? No   Do you need help taking your medications? No   Do you need help managing money? No   Do you ever drive or ride in a car without wearing a seat belt? No   Have you felt unusual stress, anger or loneliness in the last month? No   Who do you live with? Spouse   If you need help, do you have trouble finding someone available to you? No   Have you been bothered in the last four weeks by sexual problems? No   Do you have difficulty concentrating, remembering or making decisions? No           Does the patient have evidence of cognitive impairment? No    Aspirin use counseling: Taking ASA appropriately as indicated      Recent Lab Results:  CMP:  Lab Results   Component Value Date     (H) 04/08/2019    BUN 31 (H) 02/01/2022    CREATININE 0.91 02/01/2022    EGFRIFNONA 82 02/01/2022    EGFRIFAFRI 102 04/08/2019    BCR 34.1 (H) 02/01/2022     02/01/2022    K 4.1 02/01/2022    CO2 29.0 02/01/2022    CALCIUM 9.2 02/01/2022    ALBUMIN 4.10 02/01/2022    BILITOT 1.4 (H) 02/01/2022    ALKPHOS 75 02/01/2022    AST 25 02/01/2022    ALT 37 02/01/2022     Lipid Panel:  Lab Results   Component Value Date    CHOL 88 (L) 07/30/2021    TRIG 91 07/30/2021    HDL 37 (L) 07/30/2021    VLDL 18 07/30/2021    LDLHDL 0.89 07/30/2021     HbA1c:  Lab Results   Component Value Date    HGBA1C 6.50 (H)  02/01/2022       Visual Acuity:  No exam data present    Age-appropriate Screening Schedule:  Refer to the list below for future screening recommendations based on patient's age, sex and/or medical conditions. Orders for these recommended tests are listed in the plan section. The patient has been provided with a written plan.    Health Maintenance   Topic Date Due   • ZOSTER VACCINE (1 of 2) Never done   • PROSTATE CANCER SCREENING  03/02/2022   • LIPID PANEL  02/01/2023   • TDAP/TD VACCINES (2 - Td or Tdap) 11/17/2026   • INFLUENZA VACCINE  Completed        Subjective   History of Present Illness    Jaden Faith is a 72 y.o. male who presents for an Subsequent Wellness Visit.    The following portions of the patient's history were reviewed and updated as appropriate: allergies, current medications, past family history, past medical history, past social history, past surgical history and problem list.    Outpatient Medications Prior to Visit   Medication Sig Dispense Refill   • aspirin 81 MG EC tablet Take 1 tablet by mouth Daily. 30 tablet 0   • atorvastatin (LIPITOR) 80 MG tablet Take 1 tablet by mouth Every Evening. 90 tablet 1   • clopidogrel (PLAVIX) 75 MG tablet Take 1 tablet by mouth Daily. 90 tablet 1   • dicyclomine (BENTYL) 20 MG tablet TAKE 1 TABLET BY MOUTH 4 TIMES DAILY BEFORE MEAL(S) AND NIGHTLY FOR 30 DOSES     • FLUoxetine (PROzac) 40 MG capsule Take 1 capsule by mouth Daily. 90 capsule 1   • levETIRAcetam (KEPPRA) 500 MG tablet Take 1 tablet by mouth in the morning and 2 tablets at night. 270 tablet 1   • LORazepam (ATIVAN) 0.5 MG tablet Take 1 tablet by mouth Every 8 (Eight) Hours As Needed for Anxiety or Seizures (panic attacks). 20 tablet 0   • metoprolol succinate XL (TOPROL-XL) 25 MG 24 hr tablet Take 1 tablet by mouth Daily. 90 tablet 1   • Multiple Vitamins-Minerals (MULTIVITAMIN ADULT PO) Take 1 tablet by mouth Daily.     • pantoprazole (Protonix) 40 MG EC tablet Take 1 tablet by  "mouth Daily. This replaces Omeprazole. 90 tablet 1   • triamcinolone (KENALOG) 0.1 % cream Apply  topically to the appropriate area as directed 2 (Two) Times a Day. 60 g 0   • losartan (COZAAR) 25 MG tablet Take 1 tablet by mouth once daily 30 tablet 5   • nitroglycerin (NITROSTAT) 0.4 MG SL tablet Place 0.4 mg under the tongue.       No facility-administered medications prior to visit.       Patient Active Problem List   Diagnosis   • Mixed hyperlipidemia   • Gastroesophageal reflux disease without esophagitis   • Non-seasonal allergic rhinitis due to pollen   • Essential hypertension   • Peripheral vascular disease (HCC)   • Impaired glucose tolerance   • Major depression, recurrent, full remission (HCC)   • Seizures (HCC)   • Prostate cancer (HCC)   • Panic attacks   • Generalized anxiety disorder   • Atherosclerotic heart disease of native coronary artery with other forms of angina pectoris (HCC)   • Calculus of gallbladder without cholecystitis without obstruction       Advance Care Planning:  ACP discussion was held with the patient during this visit. Patient does not have an advance directive, declines further assistance.     Identification of Risk Factors:  Risk factors include: weight , unhealthy diet and depression.    Review of Systems    Compared to one year ago, the patient feels his physical health is the same.  Compared to one year ago, the patient feels his mental health is the same.    Objective     Physical Exam    Vitals:    02/22/22 0825   BP: 120/78   BP Location: Left arm   Patient Position: Sitting   Cuff Size: Large Adult   Pulse: 77  Comment: regular   Temp: 96.7 °F (35.9 °C)   TempSrc: Tympanic   SpO2: 94%   Weight: 97.5 kg (215 lb)   Height: 170.2 cm (67\")   PainSc: 0-No pain       Patient's Body mass index is 33.67 kg/m². BMI is above normal parameters. Recommendations include: educational material.      Assessment/Plan   Patient Self-Management and Personalized Health Advice  The patient " has been provided with information about: diet, exercise and weight management and preventive services including:   · Diabetes screening, see lab orders, Exercise counseling provided, Fall Risk assessment done, Nutrition counseling provided.    · Recommend Shingrix vaccine for shingles.   · Recommend annual influenza vaccine    Visit Diagnoses:    ICD-10-CM ICD-9-CM   1. Medicare annual wellness visit, subsequent  Z00.00 V70.0   2. Impaired glucose tolerance  R73.02 790.22   3. Essential hypertension  I10 401.9   4. Mixed hyperlipidemia  E78.2 272.2   5. Atherosclerotic heart disease of native coronary artery with other forms of angina pectoris ([facility])  I25.118 414.01     413.9   6. Peripheral vascular disease ([facility])  I73.9 443.9   7. Gastroesophageal reflux disease without esophagitis  K21.9 530.81   8. Major depression, recurrent, full remission ([facility])  F33.42 296.36   9. Generalized anxiety disorder  F41.1 300.02   10. Seizures ([facility])  R56.9 780.39   11. Prostate cancer ([facility])  C61 185       No orders of the defined types were placed in this encounter.      Outpatient Encounter Medications as of 2/22/2022   Medication Sig Dispense Refill   • aspirin 81 MG EC tablet Take 1 tablet by mouth Daily. 30 tablet 0   • atorvastatin (LIPITOR) 80 MG tablet Take 1 tablet by mouth Every Evening. 90 tablet 1   • clopidogrel (PLAVIX) 75 MG tablet Take 1 tablet by mouth Daily. 90 tablet 1   • dicyclomine (BENTYL) 20 MG tablet TAKE 1 TABLET BY MOUTH 4 TIMES DAILY BEFORE MEAL(S) AND NIGHTLY FOR 30 DOSES     • FLUoxetine (PROzac) 40 MG capsule Take 1 capsule by mouth Daily. 90 capsule 1   • levETIRAcetam (KEPPRA) 500 MG tablet Take 1 tablet by mouth in the morning and 2 tablets at night. 270 tablet 1   • LORazepam (ATIVAN) 0.5 MG tablet Take 1 tablet by mouth Every 8 (Eight) Hours As Needed for Anxiety or Seizures (panic attacks). 20 tablet 0   • losartan (COZAAR) 25 MG tablet Take 1 tablet by mouth Daily. 30 tablet 1   • metoprolol  succinate XL (TOPROL-XL) 25 MG 24 hr tablet Take 1 tablet by mouth Daily. 90 tablet 1   • Multiple Vitamins-Minerals (MULTIVITAMIN ADULT PO) Take 1 tablet by mouth Daily.     • pantoprazole (Protonix) 40 MG EC tablet Take 1 tablet by mouth Daily. This replaces Omeprazole. 90 tablet 1   • triamcinolone (KENALOG) 0.1 % cream Apply  topically to the appropriate area as directed 2 (Two) Times a Day. 60 g 0   • [DISCONTINUED] losartan (COZAAR) 25 MG tablet Take 1 tablet by mouth once daily 30 tablet 5   • [DISCONTINUED] losartan (COZAAR) 25 MG tablet Take 1 tablet by mouth Daily. 90 tablet 3   • nitroglycerin (NITROSTAT) 0.4 MG SL tablet Place 0.4 mg under the tongue.       No facility-administered encounter medications on file as of 2/22/2022.       Reviewed use of high risk medication in the elderly: yes  Reviewed for potential of harmful drug interactions in the elderly: yes    Follow Up:  Return in about 6 months (around 8/22/2022) for Next scheduled follow up, Or sooner as needed With Labs prior to appointment.     An After Visit Summary and PPPS with all of these plans were given to the patient.       .

## 2022-02-21 NOTE — PROGRESS NOTES
Chief Complaint   Patient presents with   • Medicare Wellness-subsequent   • Hypertension   • Hyperlipidemia   • Impaired glucose tolerance   • Atherosclerotic heart disease of native coronary artery with   • Gastroesophageal reflux disease without esophagitis   • Peripheral Vascular Disease   • Anxiety   • Depression   • Seizures     Subjective   Jaden Faith is a 72 y.o. male who presents to the office for follow-up and review of labs.  He has impaired glucose tolerance, and monitors his dietary intake of sugar and carbohydrates.  He has hypertension and his blood pressure has been controlled.  He has hyperlipidemia and takes Lipitor, 80 mg daily.  He has GERD and takes omeprazole, 20 mg daily.  He takes fluoxetine, 40 mg daily for treatment of anxiety and depression.  His depression is in full remission.  He has panic attacks and has lorazepam to use as needed. He was diagnosed with prostate cancer in 2016.  He had a prostatectomy in 2017.  He continues to follow with urology.  His urologist monitors his PSA levels.  He has mild peripheral vascular disease.  He also has coronary artery disease with stent placement.  He occasionally has anginal pain in his chest.  These have been stable, and he continues to follow with cardiology.  He takes aspirin and Plavix as part of his medication regimen for management of these. He has seizure disorder and takes Keppra, 500 mg each morning and 1000 mg at night.  He has not had any recent seizures, and continues to follow with neurology.    History of Present Illness has been reviewed and validated on 02/22/2022 and updated with any changes.    The following portions of the patient's history were reviewed and updated as appropriate: allergies, current medications, past family history, past medical history, past social history, past surgical history and problem list.    Review of Systems   Constitutional: Negative for chills, fatigue and fever.   HENT: Negative for  "congestion, sneezing, sore throat and trouble swallowing.    Eyes: Negative for visual disturbance.   Respiratory: Negative for cough, chest tightness, shortness of breath and wheezing.    Cardiovascular: Negative for chest pain, palpitations and leg swelling.   Gastrointestinal: Negative for abdominal pain, constipation, diarrhea, nausea and vomiting.   Genitourinary: Negative for dysuria, frequency and urgency.   Musculoskeletal: Negative for neck pain.   Neurological: Negative for dizziness, weakness and headaches.   Psychiatric/Behavioral:        Patient denies any feelings of depression and has not felt down, hopeless or lost interest in any activities.   All other systems reviewed and are negative.  Review of systems has been reviewed and validated on 02/22/2022 and updated with any changes.      Objective   Vitals:    02/22/22 0825   BP: 120/78   BP Location: Left arm   Patient Position: Sitting   Cuff Size: Large Adult   Pulse: 77  Comment: regular   Temp: 96.7 °F (35.9 °C)   TempSrc: Tympanic   SpO2: 94%   Weight: 97.5 kg (215 lb)   Height: 170.2 cm (67\")   PainSc: 0-No pain     Body mass index is 33.67 kg/m².    Physical Exam   Constitutional: He is oriented to person, place, and time. He appears well-developed. No distress.   HENT:   Head: Normocephalic and atraumatic.   Nose: Nose normal.   Eyes: Pupils are equal, round, and reactive to light. Conjunctivae are normal. No scleral icterus.   Cardiovascular: Normal rate, regular rhythm and normal heart sounds. Exam reveals no gallop and no friction rub.   No murmur heard.  Pulmonary/Chest: Effort normal and breath sounds normal. No respiratory distress. He has no wheezes. He has no rales.   Musculoskeletal: Normal range of motion.      Comments: There is some pain at the AC joint on his right shoulder produced with palpation and range of motion.   Lymphadenopathy:     He has no cervical adenopathy.   Neurological: He is alert and oriented to person, place, " and time. No cranial nerve deficit.   Skin: Skin is warm and dry. No rash noted.   Psychiatric: His behavior is normal. Judgment and thought content normal.   Nursing note and vitals reviewed.  Physical exam has been reviewed and validated on 02/22/2022 and updated with any changes.    Assessment/Plan             Diagnoses and all orders for this visit:    1. Medicare annual wellness visit, subsequent (Primary)    2. Impaired glucose tolerance    3. Essential hypertension  -     Discontinue: losartan (COZAAR) 25 MG tablet; Take 1 tablet by mouth Daily.  Dispense: 90 tablet; Refill: 3  -     losartan (COZAAR) 25 MG tablet; Take 1 tablet by mouth Daily.  Dispense: 30 tablet; Refill: 1    4. Mixed hyperlipidemia    5. Atherosclerotic heart disease of native coronary artery with other forms of angina pectoris (HCC)    6. Peripheral vascular disease (HCC)    7. Gastroesophageal reflux disease without esophagitis    8. Major depression, recurrent, full remission (HCC)    9. Generalized anxiety disorder    10. Seizures (HCC)    11. Prostate cancer ([facility])         Labs are reviewed with patient.  Patient's glucose is elevated at 113.  His hemoglobin A1c is 6.50.  Patient will continue to watch diet to help maintain control of the glucose.  Patient understands that there is an increased risk of developing diabetes in the future.  His LDL cholesterol is 33.  He will continue with Lipitor, 80 mg daily for treatment of hyperlipidemia.  His blood pressure is controlled, and he will continue with his current blood pressure medication.  He will continue with fluoxetine 40 mg daily for treatment of anxiety and depression.  He will also continue with lorazepam as needed for increased anxiety and panic attack.  He will continue with omeprazole 20 mg daily for treatment of GERD.  He will follow up with urology as scheduled for surveillance of the prostate cancer.  He will follow up with cardiology for ongoing management of the coronary  artery disease and peripheral vascular disease.  He will follow up with neurology as scheduled for the seizure disorder, and continue with the current dose of Keppra.  His Keppra level is therapeutic at 10.3.    Patient understands the risks associated with this controlled medication, including tolerance and addiction.  Patient also agrees to only obtain this medication from me, and not from a another provider, unless that provider is covering for me in my absence.  Patient also agrees to be compliant in dosing, and not self adjust the dose of medication.  A signed controlled substance agreement is on file, and the patient has received a controlled substance education sheet at this a previous visit.  The patient has also signed a consent for treatment with a controlled substance as per [facility] policy. ALONDRA was obtained.    Patient's Body mass index is 33.67 kg/m². indicating that he is obese (BMI >30). Obesity-related health conditions include the following: hypertension, coronary heart disease, dyslipidemias, GERD and osteoarthritis. Obesity is unchanged. BMI is is above average; BMI management plan is completed. We discussed portion control and increasing exercise..      PHQ-2/PHQ-9 Depression Screening 2/22/2022   Little interest or pleasure in doing things 0   Feeling down, depressed, or hopeless 0   Trouble falling or staying asleep, or sleeping too much -   Feeling tired or having little energy -   Poor appetite or overeating -   Feeling bad about yourself - or that you are a failure or have let yourself or your family down -   Trouble concentrating on things, such as reading the newspaper or watching television -   Moving or speaking so slowly that other people could have noticed. Or the opposite - being so fidgety or restless that you have been moving around a lot more than usual -   Thoughts that you would be better off dead, or of hurting yourself in some way -   Total Score 0   If you checked off  any problems, how difficult have these problems made it for you to do your work, take care of things at home, or get along with other people? -       Lab on 02/01/2022   Component Date Value Ref Range Status   • Glucose 02/01/2022 113* 70 - 99 mg/dL Final   • BUN 02/01/2022 31* 7 - 23 mg/dL Final   • Creatinine 02/01/2022 0.91  0.70 - 1.30 mg/dL Final   • Sodium 02/01/2022 140  137 - 145 mmol/L Final   • Potassium 02/01/2022 4.1  3.4 - 5.0 mmol/L Final   • Chloride 02/01/2022 105  101 - 112 mmol/L Final   • CO2 02/01/2022 29.0  22.0 - 30.0 mmol/L Final   • Calcium 02/01/2022 9.2  8.4 - 10.2 mg/dL Final   • Total Protein 02/01/2022 7.1  6.3 - 8.6 g/dL Final   • Albumin 02/01/2022 4.10  3.50 - 5.00 g/dL Final   • ALT (SGPT) 02/01/2022 37  <=50 U/L Final   • AST (SGOT) 02/01/2022 25  17 - 59 U/L Final   • Alkaline Phosphatase 02/01/2022 75  38 - 126 U/L Final   • Total Bilirubin 02/01/2022 1.4* 0.2 - 1.3 mg/dL Final   • eGFR Non African Amer 02/01/2022 82  42 - 98 mL/min/1.73 Final   • Globulin 02/01/2022 3.0  2.3 - 3.5 gm/dL Final   • A/G Ratio 02/01/2022 1.4  1.1 - 1.8 g/dL Final   • BUN/Creatinine Ratio 02/01/2022 34.1* 7.0 - 25.0 Final   • Anion Gap 02/01/2022 6.0  5.0 - 15.0 mmol/L Final   • Free T4 02/01/2022 1.27  0.93 - 1.70 ng/dL Final   • TSH 02/01/2022 2.120  0.270 - 4.200 uIU/mL Final   • Color, UA 02/01/2022 Yellow  Yellow, Straw Final   • Appearance, UA 02/01/2022 Clear  Clear Final   • pH, UA 02/01/2022 5.5  5.5 - 8.0 Final   • Specific Gravity, UA 02/01/2022 >=1.030  1.005 - 1.030 Final   • Glucose, UA 02/01/2022 Negative  Negative Final   • Ketones, UA 02/01/2022 Negative  Negative Final   • Bilirubin, UA 02/01/2022 Negative  Negative Final   • Blood, UA 02/01/2022 Negative  Negative Final   • Protein, UA 02/01/2022 Negative  Negative Final   • Leuk Esterase, UA 02/01/2022 Negative  Negative Final   • Nitrite, UA 02/01/2022 Negative  Negative Final   • Urobilinogen, UA 02/01/2022 0.2 E.U./dL  0.2 - 1.0  E.U./dL Final   • Hemoglobin A1C 02/01/2022 6.50* 4.80 - 5.60 % Final   • LDL Cholesterol  02/01/2022 33  0 - 100 mg/dL Final   • WBC 02/01/2022 10.21  3.40 - 10.80 10*3/mm3 Final   • RBC 02/01/2022 5.42  4.14 - 5.80 10*6/mm3 Final   • Hemoglobin 02/01/2022 16.3  13.0 - 17.7 g/dL Final   • Hematocrit 02/01/2022 50.6  37.5 - 51.0 % Final   • MCV 02/01/2022 93.4  79.0 - 97.0 fL Final   • MCH 02/01/2022 30.1  26.6 - 33.0 pg Final   • MCHC 02/01/2022 32.2  31.5 - 35.7 g/dL Final   • RDW 02/01/2022 14.0  12.3 - 15.4 % Final   • RDW-SD 02/01/2022 47.2  37.0 - 54.0 fl Final   • MPV 02/01/2022 11.3  6.0 - 12.0 fL Final   • Platelets 02/01/2022 193  140 - 450 10*3/mm3 Final   • Neutrophil % 02/01/2022 69.6  42.7 - 76.0 % Final   • Lymphocyte % 02/01/2022 20.7  19.6 - 45.3 % Final   • Monocyte % 02/01/2022 7.4  5.0 - 12.0 % Final   • Eosinophil % 02/01/2022 1.8  0.3 - 6.2 % Final   • Basophil % 02/01/2022 0.5  0.0 - 1.5 % Final   • Neutrophils, Absolute 02/01/2022 7.11* 1.70 - 7.00 10*3/mm3 Final   • Lymphocytes, Absolute 02/01/2022 2.11  0.70 - 3.10 10*3/mm3 Final   • Monocytes, Absolute 02/01/2022 0.76  0.10 - 0.90 10*3/mm3 Final   • Eosinophils, Absolute 02/01/2022 0.18  0.00 - 0.40 10*3/mm3 Final   • Basophils, Absolute 02/01/2022 0.05  0.00 - 0.20 10*3/mm3 Final   ]       .  .  .

## 2022-02-22 ENCOUNTER — OFFICE VISIT (OUTPATIENT)
Dept: FAMILY MEDICINE CLINIC | Facility: CLINIC | Age: 72
End: 2022-02-22

## 2022-02-22 VITALS
DIASTOLIC BLOOD PRESSURE: 78 MMHG | HEIGHT: 67 IN | HEART RATE: 77 BPM | SYSTOLIC BLOOD PRESSURE: 120 MMHG | OXYGEN SATURATION: 94 % | WEIGHT: 215 LBS | TEMPERATURE: 96.7 F | BODY MASS INDEX: 33.74 KG/M2

## 2022-02-22 DIAGNOSIS — Z00.00 MEDICARE ANNUAL WELLNESS VISIT, SUBSEQUENT: Primary | ICD-10-CM

## 2022-02-22 DIAGNOSIS — F41.1 GENERALIZED ANXIETY DISORDER: ICD-10-CM

## 2022-02-22 DIAGNOSIS — I73.9 PERIPHERAL VASCULAR DISEASE: ICD-10-CM

## 2022-02-22 DIAGNOSIS — E78.2 MIXED HYPERLIPIDEMIA: ICD-10-CM

## 2022-02-22 DIAGNOSIS — I10 ESSENTIAL HYPERTENSION: ICD-10-CM

## 2022-02-22 DIAGNOSIS — R73.02 IMPAIRED GLUCOSE TOLERANCE: ICD-10-CM

## 2022-02-22 DIAGNOSIS — I25.118 ATHEROSCLEROTIC HEART DISEASE OF NATIVE CORONARY ARTERY WITH OTHER FORMS OF ANGINA PECTORIS: ICD-10-CM

## 2022-02-22 DIAGNOSIS — K21.9 GASTROESOPHAGEAL REFLUX DISEASE WITHOUT ESOPHAGITIS: ICD-10-CM

## 2022-02-22 DIAGNOSIS — R56.9 SEIZURES: ICD-10-CM

## 2022-02-22 DIAGNOSIS — C61 PROSTATE CANCER: ICD-10-CM

## 2022-02-22 DIAGNOSIS — F33.42 MAJOR DEPRESSION, RECURRENT, FULL REMISSION: ICD-10-CM

## 2022-02-22 PROCEDURE — G0439 PPPS, SUBSEQ VISIT: HCPCS | Performed by: INTERNAL MEDICINE

## 2022-02-22 PROCEDURE — 1159F MED LIST DOCD IN RCRD: CPT | Performed by: INTERNAL MEDICINE

## 2022-02-22 PROCEDURE — 96160 PT-FOCUSED HLTH RISK ASSMT: CPT | Performed by: INTERNAL MEDICINE

## 2022-02-22 RX ORDER — LOSARTAN POTASSIUM 25 MG/1
25 TABLET ORAL DAILY
Qty: 30 TABLET | Refills: 1 | Status: SHIPPED | OUTPATIENT
Start: 2022-02-22 | End: 2022-08-22 | Stop reason: SDUPTHER

## 2022-02-22 RX ORDER — LOSARTAN POTASSIUM 25 MG/1
25 TABLET ORAL DAILY
Qty: 90 TABLET | Refills: 3 | Status: SHIPPED | OUTPATIENT
Start: 2022-02-22 | End: 2022-02-22 | Stop reason: SDUPTHER

## 2022-05-08 DIAGNOSIS — F41.1 GENERALIZED ANXIETY DISORDER: Chronic | ICD-10-CM

## 2022-05-08 DIAGNOSIS — F33.42 MAJOR DEPRESSION, RECURRENT, FULL REMISSION: Chronic | ICD-10-CM

## 2022-05-09 RX ORDER — FLUOXETINE HYDROCHLORIDE 40 MG/1
CAPSULE ORAL
Qty: 90 CAPSULE | Refills: 1 | Status: SHIPPED | OUTPATIENT
Start: 2022-05-09

## 2022-05-23 DIAGNOSIS — E78.2 MIXED HYPERLIPIDEMIA: Chronic | ICD-10-CM

## 2022-05-23 RX ORDER — ATORVASTATIN CALCIUM 80 MG/1
TABLET, FILM COATED ORAL
Qty: 90 TABLET | Refills: 1 | Status: SHIPPED | OUTPATIENT
Start: 2022-05-23

## 2022-05-28 DIAGNOSIS — R56.9 SEIZURES: Chronic | ICD-10-CM

## 2022-05-28 DIAGNOSIS — I10 ESSENTIAL HYPERTENSION: Chronic | ICD-10-CM

## 2022-05-28 DIAGNOSIS — K21.9 GASTROESOPHAGEAL REFLUX DISEASE WITHOUT ESOPHAGITIS: ICD-10-CM

## 2022-05-31 RX ORDER — PANTOPRAZOLE SODIUM 40 MG/1
40 TABLET, DELAYED RELEASE ORAL DAILY
Qty: 90 TABLET | Refills: 1 | Status: SHIPPED | OUTPATIENT
Start: 2022-05-31

## 2022-05-31 RX ORDER — METOPROLOL SUCCINATE 25 MG/1
TABLET, EXTENDED RELEASE ORAL
Qty: 90 TABLET | Refills: 1 | Status: SHIPPED | OUTPATIENT
Start: 2022-05-31

## 2022-05-31 RX ORDER — LEVETIRACETAM 500 MG/1
TABLET ORAL
Qty: 270 TABLET | Refills: 1 | Status: SHIPPED | OUTPATIENT
Start: 2022-05-31

## 2022-08-15 ENCOUNTER — TELEPHONE (OUTPATIENT)
Dept: FAMILY MEDICINE CLINIC | Facility: CLINIC | Age: 72
End: 2022-08-15

## 2022-08-15 DIAGNOSIS — C61 PROSTATE CANCER: Primary | ICD-10-CM

## 2022-08-15 NOTE — TELEPHONE ENCOUNTER
Jaden was advised he is overdue for his appt with JEROME Pierre with [facility]. He was supposed to follow up with her March 2022. We will add the lab and he will schedule with Gabby.

## 2022-08-16 ENCOUNTER — LAB (OUTPATIENT)
Dept: LAB | Facility: OTHER | Age: 72
End: 2022-08-16

## 2022-08-16 DIAGNOSIS — R73.02 IMPAIRED GLUCOSE TOLERANCE: Chronic | ICD-10-CM

## 2022-08-16 DIAGNOSIS — C61 PROSTATE CANCER: ICD-10-CM

## 2022-08-16 DIAGNOSIS — Z79.899 DRUG THERAPY: ICD-10-CM

## 2022-08-16 DIAGNOSIS — I10 ESSENTIAL HYPERTENSION: ICD-10-CM

## 2022-08-16 DIAGNOSIS — R56.9 SEIZURES: Chronic | ICD-10-CM

## 2022-08-16 DIAGNOSIS — E78.2 MIXED HYPERLIPIDEMIA: Chronic | ICD-10-CM

## 2022-08-16 LAB
ALBUMIN SERPL-MCNC: 4.2 G/DL (ref 3.5–5)
ALBUMIN/GLOB SERPL: 1.4 G/DL (ref 1.1–1.8)
ALP SERPL-CCNC: 91 U/L (ref 38–126)
ALT SERPL W P-5'-P-CCNC: 31 U/L
ANION GAP SERPL CALCULATED.3IONS-SCNC: 6 MMOL/L (ref 5–15)
AST SERPL-CCNC: 28 U/L (ref 17–59)
BACTERIA UR QL AUTO: ABNORMAL /HPF
BASOPHILS # BLD AUTO: 0.06 10*3/MM3 (ref 0–0.2)
BASOPHILS NFR BLD AUTO: 0.7 % (ref 0–1.5)
BILIRUB SERPL-MCNC: 1.9 MG/DL (ref 0.2–1.3)
BILIRUB UR QL STRIP: NEGATIVE
BUN SERPL-MCNC: 24 MG/DL (ref 7–23)
BUN/CREAT SERPL: 27.9 (ref 7–25)
CALCIUM SPEC-SCNC: 8.9 MG/DL (ref 8.4–10.2)
CHLORIDE SERPL-SCNC: 107 MMOL/L (ref 101–112)
CHOLEST SERPL-MCNC: 87 MG/DL (ref 150–200)
CLARITY UR: CLEAR
CO2 SERPL-SCNC: 28 MMOL/L (ref 22–30)
COLOR UR: YELLOW
CREAT SERPL-MCNC: 0.86 MG/DL (ref 0.7–1.3)
DEPRECATED RDW RBC AUTO: 46.6 FL (ref 37–54)
EGFRCR SERPLBLD CKD-EPI 2021: 92.6 ML/MIN/1.73
EOSINOPHIL # BLD AUTO: 0.2 10*3/MM3 (ref 0–0.4)
EOSINOPHIL NFR BLD AUTO: 2.4 % (ref 0.3–6.2)
ERYTHROCYTE [DISTWIDTH] IN BLOOD BY AUTOMATED COUNT: 14.1 % (ref 12.3–15.4)
GLOBULIN UR ELPH-MCNC: 2.9 GM/DL (ref 2.3–3.5)
GLUCOSE SERPL-MCNC: 127 MG/DL (ref 70–99)
GLUCOSE UR STRIP-MCNC: NEGATIVE MG/DL
HBA1C MFR BLD: 7.2 % (ref 4.8–5.6)
HCT VFR BLD AUTO: 51.6 % (ref 37.5–51)
HDLC SERPL-MCNC: 28 MG/DL (ref 40–59)
HGB BLD-MCNC: 16.7 G/DL (ref 13–17.7)
HGB UR QL STRIP.AUTO: ABNORMAL
HYALINE CASTS UR QL AUTO: ABNORMAL /LPF
KETONES UR QL STRIP: NEGATIVE
LDLC SERPL CALC-MCNC: 36 MG/DL
LDLC/HDLC SERPL: 1.19 {RATIO} (ref 0–3.55)
LEUKOCYTE ESTERASE UR QL STRIP.AUTO: NEGATIVE
LYMPHOCYTES # BLD AUTO: 1.98 10*3/MM3 (ref 0.7–3.1)
LYMPHOCYTES NFR BLD AUTO: 23.7 % (ref 19.6–45.3)
MCH RBC QN AUTO: 29.6 PG (ref 26.6–33)
MCHC RBC AUTO-ENTMCNC: 32.4 G/DL (ref 31.5–35.7)
MCV RBC AUTO: 91.5 FL (ref 79–97)
MONOCYTES # BLD AUTO: 0.59 10*3/MM3 (ref 0.1–0.9)
MONOCYTES NFR BLD AUTO: 7.1 % (ref 5–12)
MUCOUS THREADS URNS QL MICRO: ABNORMAL /HPF
NEUTROPHILS NFR BLD AUTO: 5.53 10*3/MM3 (ref 1.7–7)
NEUTROPHILS NFR BLD AUTO: 66.1 % (ref 42.7–76)
NITRITE UR QL STRIP: NEGATIVE
PH UR STRIP.AUTO: 5.5 [PH] (ref 5.5–8)
PLATELET # BLD AUTO: 201 10*3/MM3 (ref 140–450)
PMV BLD AUTO: 11.5 FL (ref 6–12)
POTASSIUM SERPL-SCNC: 4 MMOL/L (ref 3.4–5)
PROT SERPL-MCNC: 7.1 G/DL (ref 6.3–8.6)
PROT UR QL STRIP: ABNORMAL
PSA SERPL-MCNC: 0.07 NG/ML (ref 0–4)
RBC # BLD AUTO: 5.64 10*6/MM3 (ref 4.14–5.8)
RBC # UR STRIP: ABNORMAL /HPF
REF LAB TEST METHOD: ABNORMAL
SODIUM SERPL-SCNC: 141 MMOL/L (ref 137–145)
SP GR UR STRIP: >=1.03 (ref 1–1.03)
SQUAMOUS #/AREA URNS HPF: ABNORMAL /HPF
T4 FREE SERPL-MCNC: 1.25 NG/DL (ref 0.93–1.7)
TRIGL SERPL-MCNC: 129 MG/DL
TSH SERPL DL<=0.05 MIU/L-ACNC: 1.8 UIU/ML (ref 0.27–4.2)
UROBILINOGEN UR QL STRIP: ABNORMAL
VLDLC SERPL-MCNC: 23 MG/DL (ref 5–40)
WBC # UR STRIP: ABNORMAL /HPF
WBC NRBC COR # BLD: 8.36 10*3/MM3 (ref 3.4–10.8)

## 2022-08-16 PROCEDURE — 80053 COMPREHEN METABOLIC PANEL: CPT | Performed by: INTERNAL MEDICINE

## 2022-08-16 PROCEDURE — 36415 COLL VENOUS BLD VENIPUNCTURE: CPT | Performed by: INTERNAL MEDICINE

## 2022-08-16 PROCEDURE — 81001 URINALYSIS AUTO W/SCOPE: CPT | Performed by: INTERNAL MEDICINE

## 2022-08-16 PROCEDURE — 80177 DRUG SCRN QUAN LEVETIRACETAM: CPT | Performed by: INTERNAL MEDICINE

## 2022-08-16 PROCEDURE — 84439 ASSAY OF FREE THYROXINE: CPT | Performed by: INTERNAL MEDICINE

## 2022-08-16 PROCEDURE — 80061 LIPID PANEL: CPT | Performed by: INTERNAL MEDICINE

## 2022-08-16 PROCEDURE — 84443 ASSAY THYROID STIM HORMONE: CPT | Performed by: INTERNAL MEDICINE

## 2022-08-16 PROCEDURE — 84153 ASSAY OF PSA TOTAL: CPT | Performed by: INTERNAL MEDICINE

## 2022-08-16 PROCEDURE — 85025 COMPLETE CBC W/AUTO DIFF WBC: CPT | Performed by: INTERNAL MEDICINE

## 2022-08-16 PROCEDURE — 83036 HEMOGLOBIN GLYCOSYLATED A1C: CPT | Performed by: INTERNAL MEDICINE

## 2022-08-19 PROBLEM — E11.65 TYPE 2 DIABETES MELLITUS WITH HYPERGLYCEMIA, WITHOUT LONG-TERM CURRENT USE OF INSULIN (HCC): Chronic | Status: ACTIVE | Noted: 2022-08-19

## 2022-08-19 LAB — LEVETIRACETAM SERPL-MCNC: 10.1 UG/ML (ref 10–40)

## 2022-08-19 NOTE — PROGRESS NOTES
Chief Complaint   Patient presents with   • Hyperlipidemia   • Hypertension   • Impaired glucose tolerance   • Gastroesophageal reflux disease without esophagitis   • Prostate Cancer   • Anxiety   • Depression   • Seizures   • Atherosclerotic heart disease of native coronary artery wit     Subjective   Jaden Faith is a 72 y.o. male who presents to the office for follow-up and review of labs.  He has impaired glucose tolerance, and monitors his dietary intake of sugar and carbohydrates.  He has hypertension and his blood pressure has been controlled.  He has hyperlipidemia and takes Lipitor, 80 mg daily.  He has GERD and takes omeprazole, 20 mg daily.  He takes fluoxetine, 40 mg daily for treatment of anxiety and depression.  His depression is in full remission.  He has panic attacks and was previously taking lorazepam as needed.  He no longer takes this medication.  He was diagnosed with prostate cancer in 2016.  He had a prostatectomy in 2017.  He continues to follow with urology.  His urologist monitors his PSA levels.  He has mild peripheral vascular disease.  He also has coronary artery disease with stent placement.  He occasionally has anginal pain in his chest.  These have been stable, and he continues to follow with cardiology.  He takes aspirin and Plavix as part of his medication regimen for management of these. He has seizure disorder and takes Keppra, 500 mg each morning and 1000 mg at night.  He has not had any recent seizures, and continues to follow with neurology.    History of Present Illness has been reviewed and validated on 08/22/2022 and updated with any changes.    The following portions of the patient's history were reviewed and updated as appropriate: allergies, current medications, past family history, past medical history, past social history, past surgical history and problem list.    Review of Systems   Constitutional: Negative for chills, fatigue and fever.   HENT: Negative  "for congestion, sneezing, sore throat and trouble swallowing.    Eyes: Negative for visual disturbance.   Respiratory: Negative for cough, chest tightness, shortness of breath and wheezing.    Cardiovascular: Negative for chest pain, palpitations and leg swelling.   Gastrointestinal: Negative for abdominal pain, constipation, diarrhea, nausea and vomiting.   Genitourinary: Negative for dysuria, frequency and urgency.   Musculoskeletal: Negative for neck pain.   Neurological: Negative for dizziness, weakness and headaches.   Psychiatric/Behavioral:        Patient denies any feelings of depression and has not felt down, hopeless or lost interest in any activities.   All other systems reviewed and are negative.  Review of systems has been reviewed and validated on 08/22/2022 and updated with any changes.      Objective   Vitals:    08/22/22 0935   BP: 118/70   BP Location: Left arm   Patient Position: Sitting   Cuff Size: Large Adult   Pulse: 62  Comment: regular   Temp: 98.6 °F (37 °C)   TempSrc: Temporal   SpO2: 92%   Weight: 98 kg (216 lb)   Height: 170.2 cm (67\")   PainSc: 0-No pain     Body mass index is 33.83 kg/m².    Physical Exam   Constitutional: He is oriented to person, place, and time. He appears well-developed. No distress.   HENT:   Head: Normocephalic and atraumatic.   Nose: Nose normal.   Eyes: Pupils are equal, round, and reactive to light. Conjunctivae are normal. No scleral icterus.   Cardiovascular: Normal rate, regular rhythm and normal heart sounds. Exam reveals no gallop and no friction rub.   No murmur heard.  Pulmonary/Chest: Effort normal and breath sounds normal. No respiratory distress. He has no wheezes. He has no rales.   Musculoskeletal: Normal range of motion.   Lymphadenopathy:     He has no cervical adenopathy.   Neurological: He is alert and oriented to person, place, and time. No cranial nerve deficit.   Skin: Skin is warm and dry. No rash noted.   Psychiatric: His behavior is " normal. Judgment and thought content normal.   Nursing note and vitals reviewed.  Physical exam has been reviewed and validated on 08/22/2022 and updated with any changes.    Assessment & Plan             Diagnoses and all orders for this visit:    1. Type 2 diabetes mellitus with hyperglycemia, without long-term current use of insulin (HCC) (Primary)  -     Hemoglobin A1c; Future  -     Microalbumin / Creatinine Urine Ratio - Urine, Clean Catch; Future  -     metFORMIN ER (GLUCOPHAGE-XR) 500 MG 24 hr tablet; Take 2 tablets by mouth Daily With Dinner.  Dispense: 180 tablet; Refill: 1    2. Essential hypertension  -     CBC & Differential; Future  -     Comprehensive Metabolic Panel; Future  -     T4, Free; Future  -     TSH; Future  -     Urinalysis With Culture If Indicated -; Future  -     Discontinue: losartan (COZAAR) 25 MG tablet; Take 1 tablet by mouth Daily.  Dispense: 30 tablet; Refill: 1  -     losartan (COZAAR) 25 MG tablet; Take 1 tablet by mouth Daily.  Dispense: 90 tablet; Refill: 1    3. Mixed hyperlipidemia  -     LDL Cholesterol, Direct; Future    4. Peripheral vascular disease (HCC)    5. Atherosclerotic heart disease of native coronary artery with other forms of angina pectoris (HCC)  -     clopidogrel (PLAVIX) 75 MG tablet; Take 1 tablet by mouth Daily.  Dispense: 90 tablet; Refill: 1    6. Gastroesophageal reflux disease without esophagitis    7. Major depression, recurrent, full remission (HCC)    8. Generalized anxiety disorder    9. Seizures ([facility])    10. Prostate cancer ([facility])         Labs are reviewed with patient.  Patient's glucose is elevated at 127.  His hemoglobin A1c is 7.20.  He now meets criteria for diagnosis of diabetes.  I will start metformin ER 1000 mg daily with his evening meal.  He may monitor blood sugar 1 time daily.  His total cholesterol is 87, LDL 36 and triglycerides 129.  His HDL is 28.  He will continue with Lipitor, 80 mg daily for treatment of hyperlipidemia.  His  blood pressure is controlled, and he will continue with his current blood pressure medication.  He will continue with fluoxetine 40 mg daily for treatment of anxiety and depression. He will continue with omeprazole 20 mg daily for treatment of GERD.  His PSA is normal at 0.075.  He will follow up with urology as scheduled for surveillance of the prostate cancer.  He will follow up with cardiology for ongoing management of the coronary artery disease and peripheral vascular disease.  He will follow up with neurology as scheduled for the seizure disorder, and continue with the current dose of Keppra.       PHQ-2/PHQ-9 Depression Screening 8/22/2022   Retired PHQ-9 Total Score -   Retired Total Score -   Little Interest or Pleasure in Doing Things 0-->not at all   Feeling Down, Depressed or Hopeless 0-->not at all   PHQ-9: Brief Depression Severity Measure Score 0

## 2022-08-22 ENCOUNTER — OFFICE VISIT (OUTPATIENT)
Dept: FAMILY MEDICINE CLINIC | Facility: CLINIC | Age: 72
End: 2022-08-22

## 2022-08-22 VITALS
HEIGHT: 67 IN | OXYGEN SATURATION: 92 % | SYSTOLIC BLOOD PRESSURE: 118 MMHG | WEIGHT: 216 LBS | DIASTOLIC BLOOD PRESSURE: 70 MMHG | TEMPERATURE: 98.6 F | HEART RATE: 62 BPM | BODY MASS INDEX: 33.9 KG/M2

## 2022-08-22 DIAGNOSIS — C61 PROSTATE CANCER: Chronic | ICD-10-CM

## 2022-08-22 DIAGNOSIS — I10 ESSENTIAL HYPERTENSION: Chronic | ICD-10-CM

## 2022-08-22 DIAGNOSIS — F41.1 GENERALIZED ANXIETY DISORDER: Chronic | ICD-10-CM

## 2022-08-22 DIAGNOSIS — I25.118 ATHEROSCLEROTIC HEART DISEASE OF NATIVE CORONARY ARTERY WITH OTHER FORMS OF ANGINA PECTORIS: Chronic | ICD-10-CM

## 2022-08-22 DIAGNOSIS — R56.9 SEIZURES: Chronic | ICD-10-CM

## 2022-08-22 DIAGNOSIS — F33.42 MAJOR DEPRESSION, RECURRENT, FULL REMISSION: Chronic | ICD-10-CM

## 2022-08-22 DIAGNOSIS — I73.9 PERIPHERAL VASCULAR DISEASE: Chronic | ICD-10-CM

## 2022-08-22 DIAGNOSIS — E11.65 TYPE 2 DIABETES MELLITUS WITH HYPERGLYCEMIA, WITHOUT LONG-TERM CURRENT USE OF INSULIN: Primary | Chronic | ICD-10-CM

## 2022-08-22 DIAGNOSIS — E78.2 MIXED HYPERLIPIDEMIA: Chronic | ICD-10-CM

## 2022-08-22 DIAGNOSIS — K21.9 GASTROESOPHAGEAL REFLUX DISEASE WITHOUT ESOPHAGITIS: Chronic | ICD-10-CM

## 2022-08-22 PROCEDURE — 99214 OFFICE O/P EST MOD 30 MIN: CPT | Performed by: INTERNAL MEDICINE

## 2022-08-22 RX ORDER — LOSARTAN POTASSIUM 25 MG/1
25 TABLET ORAL DAILY
Qty: 30 TABLET | Refills: 1 | Status: SHIPPED | OUTPATIENT
Start: 2022-08-22 | End: 2022-08-22 | Stop reason: SDUPTHER

## 2022-08-22 RX ORDER — METFORMIN HYDROCHLORIDE 500 MG/1
1000 TABLET, EXTENDED RELEASE ORAL
Qty: 180 TABLET | Refills: 1 | Status: SHIPPED | OUTPATIENT
Start: 2022-08-22

## 2022-08-22 RX ORDER — LOSARTAN POTASSIUM 25 MG/1
25 TABLET ORAL DAILY
Qty: 90 TABLET | Refills: 1 | Status: SHIPPED | OUTPATIENT
Start: 2022-08-22

## 2022-08-22 RX ORDER — CLOPIDOGREL BISULFATE 75 MG/1
75 TABLET ORAL DAILY
Qty: 90 TABLET | Refills: 1 | Status: SHIPPED | OUTPATIENT
Start: 2022-08-22

## 2022-08-29 DIAGNOSIS — E11.65 TYPE 2 DIABETES MELLITUS WITH HYPERGLYCEMIA, WITHOUT LONG-TERM CURRENT USE OF INSULIN: ICD-10-CM

## 2022-08-29 DIAGNOSIS — I10 ESSENTIAL HYPERTENSION: ICD-10-CM

## 2022-08-29 DIAGNOSIS — E78.2 MIXED HYPERLIPIDEMIA: Primary | ICD-10-CM

## 2022-12-14 DIAGNOSIS — F41.1 GENERALIZED ANXIETY DISORDER: Chronic | ICD-10-CM

## 2022-12-14 DIAGNOSIS — F33.42 MAJOR DEPRESSION, RECURRENT, FULL REMISSION: Chronic | ICD-10-CM

## 2022-12-15 RX ORDER — FLUOXETINE HYDROCHLORIDE 40 MG/1
CAPSULE ORAL
Qty: 90 CAPSULE | Refills: 1 | OUTPATIENT
Start: 2022-12-15

## 2022-12-25 DIAGNOSIS — E78.2 MIXED HYPERLIPIDEMIA: Chronic | ICD-10-CM

## 2022-12-27 RX ORDER — ATORVASTATIN CALCIUM 80 MG/1
TABLET, FILM COATED ORAL
Qty: 90 TABLET | Refills: 1 | OUTPATIENT
Start: 2022-12-27

## 2022-12-28 DIAGNOSIS — K21.9 GASTROESOPHAGEAL REFLUX DISEASE WITHOUT ESOPHAGITIS: ICD-10-CM

## 2022-12-28 DIAGNOSIS — I10 ESSENTIAL HYPERTENSION: Chronic | ICD-10-CM

## 2022-12-28 DIAGNOSIS — R56.9 SEIZURES: Chronic | ICD-10-CM

## 2022-12-29 DIAGNOSIS — F41.1 GENERALIZED ANXIETY DISORDER: Chronic | ICD-10-CM

## 2022-12-29 DIAGNOSIS — F33.42 MAJOR DEPRESSION, RECURRENT, FULL REMISSION: Chronic | ICD-10-CM

## 2022-12-29 DIAGNOSIS — E78.2 MIXED HYPERLIPIDEMIA: Chronic | ICD-10-CM

## 2022-12-29 RX ORDER — LEVETIRACETAM 500 MG/1
TABLET ORAL
Qty: 270 TABLET | Refills: 1 | OUTPATIENT
Start: 2022-12-29

## 2022-12-29 RX ORDER — METOPROLOL SUCCINATE 25 MG/1
TABLET, EXTENDED RELEASE ORAL
Qty: 90 TABLET | Refills: 1 | OUTPATIENT
Start: 2022-12-29

## 2022-12-29 RX ORDER — PANTOPRAZOLE SODIUM 40 MG/1
TABLET, DELAYED RELEASE ORAL
Qty: 90 TABLET | Refills: 1 | OUTPATIENT
Start: 2022-12-29

## 2022-12-29 RX ORDER — FLUOXETINE HYDROCHLORIDE 40 MG/1
CAPSULE ORAL
Qty: 90 CAPSULE | Refills: 1 | OUTPATIENT
Start: 2022-12-29

## 2022-12-29 RX ORDER — ATORVASTATIN CALCIUM 80 MG/1
TABLET, FILM COATED ORAL
Qty: 90 TABLET | Refills: 1 | OUTPATIENT
Start: 2022-12-29

## 2023-01-15 DIAGNOSIS — E11.65 TYPE 2 DIABETES MELLITUS WITH HYPERGLYCEMIA, WITHOUT LONG-TERM CURRENT USE OF INSULIN: Chronic | ICD-10-CM

## 2023-01-16 RX ORDER — METFORMIN HYDROCHLORIDE 500 MG/1
TABLET, EXTENDED RELEASE ORAL
Qty: 180 TABLET | Refills: 1 | OUTPATIENT
Start: 2023-01-16

## 2023-03-01 DIAGNOSIS — I10 ESSENTIAL HYPERTENSION: Chronic | ICD-10-CM

## 2023-03-01 RX ORDER — LOSARTAN POTASSIUM 25 MG/1
TABLET ORAL
Qty: 90 TABLET | Refills: 1 | OUTPATIENT
Start: 2023-03-01

## 2023-05-13 DIAGNOSIS — I25.118 ATHEROSCLEROTIC HEART DISEASE OF NATIVE CORONARY ARTERY WITH OTHER FORMS OF ANGINA PECTORIS: Chronic | ICD-10-CM

## 2023-05-15 RX ORDER — CLOPIDOGREL BISULFATE 75 MG/1
TABLET ORAL
Qty: 90 TABLET | Refills: 1 | OUTPATIENT
Start: 2023-05-15